# Patient Record
Sex: FEMALE | Race: WHITE | NOT HISPANIC OR LATINO | Employment: FULL TIME | URBAN - METROPOLITAN AREA
[De-identification: names, ages, dates, MRNs, and addresses within clinical notes are randomized per-mention and may not be internally consistent; named-entity substitution may affect disease eponyms.]

---

## 2019-09-13 ENCOUNTER — LAB VISIT (OUTPATIENT)
Dept: LAB | Facility: HOSPITAL | Age: 29
End: 2019-09-13
Attending: SPECIALIST
Payer: COMMERCIAL

## 2019-09-13 DIAGNOSIS — Z34.81 PRENATAL CARE, SUBSEQUENT PREGNANCY, FIRST TRIMESTER: Primary | ICD-10-CM

## 2019-09-13 LAB — HCG INTACT+B SERPL-ACNC: 5689 MIU/ML

## 2019-09-13 PROCEDURE — 84144 ASSAY OF PROGESTERONE: CPT

## 2019-09-13 PROCEDURE — 84702 CHORIONIC GONADOTROPIN TEST: CPT

## 2019-09-13 PROCEDURE — 36415 COLL VENOUS BLD VENIPUNCTURE: CPT

## 2019-09-14 LAB — PROGEST SERPL-MCNC: 25.2 NG/ML

## 2019-09-19 LAB
C TRACH RRNA SPEC QL PROBE: NORMAL
HBV SURFACE AG SERPL QL IA: NEGATIVE
HIV 1+2 AB+HIV1 P24 AG SERPL QL IA: NEGATIVE
INDIRECT COOMBS: NEGATIVE
N GONORRHOEAE, AMPLIFIED DNA: NORMAL
RPR: NORMAL
RUBELLA IMMUNE STATUS: NORMAL

## 2020-04-01 LAB — PRENATAL STREP B CULTURE: POSITIVE

## 2020-05-12 ENCOUNTER — HOSPITAL ENCOUNTER (INPATIENT)
Facility: HOSPITAL | Age: 30
LOS: 4 days | Discharge: HOME | End: 2020-05-16
Attending: SPECIALIST | Admitting: SPECIALIST
Payer: COMMERCIAL

## 2020-05-12 DIAGNOSIS — Z34.90 ENCOUNTER FOR INDUCTION OF LABOR: ICD-10-CM

## 2020-05-12 LAB
ABO + RH BLD: NORMAL
AMPHET+METHAMPHET UR QL: NEGATIVE
BARBITURATES UR QL SCN>200 NG/ML: NEGATIVE
BASOPHILS # BLD AUTO: 0.01 K/UL (ref 0–0.2)
BASOPHILS NFR BLD: 0.1 % (ref 0–1.9)
BENZODIAZ UR QL SCN>200 NG/ML: NEGATIVE
BILIRUB UR QL STRIP: NEGATIVE
BLD GP AB SCN CELLS X3 SERPL QL: NORMAL
BZE UR QL SCN: NEGATIVE
CANNABINOIDS UR QL SCN: NEGATIVE
CLARITY UR: ABNORMAL
COLOR UR: YELLOW
CREAT UR-MCNC: 88 MG/DL (ref 15–325)
DIFFERENTIAL METHOD: ABNORMAL
EOSINOPHIL # BLD AUTO: 0.1 K/UL (ref 0–0.5)
EOSINOPHIL NFR BLD: 1 % (ref 0–8)
ERYTHROCYTE [DISTWIDTH] IN BLOOD BY AUTOMATED COUNT: 13.2 % (ref 11.5–14.5)
GLUCOSE UR QL STRIP: NEGATIVE
HCT VFR BLD AUTO: 37.7 % (ref 37–48.5)
HGB BLD-MCNC: 12.8 G/DL (ref 12–16)
HGB UR QL STRIP: NEGATIVE
IMM GRANULOCYTES # BLD AUTO: 0.07 K/UL (ref 0–0.04)
IMM GRANULOCYTES NFR BLD AUTO: 0.5 % (ref 0–0.5)
KETONES UR QL STRIP: NEGATIVE
LEUKOCYTE ESTERASE UR QL STRIP: NEGATIVE
LYMPHOCYTES # BLD AUTO: 1.8 K/UL (ref 1–4.8)
LYMPHOCYTES NFR BLD: 12.5 % (ref 18–48)
MCH RBC QN AUTO: 31 PG (ref 27–31)
MCHC RBC AUTO-ENTMCNC: 34 G/DL (ref 32–36)
MCV RBC AUTO: 91 FL (ref 82–98)
MONOCYTES # BLD AUTO: 0.8 K/UL (ref 0.3–1)
MONOCYTES NFR BLD: 6 % (ref 4–15)
NEUTROPHILS # BLD AUTO: 11.2 K/UL (ref 1.8–7.7)
NEUTROPHILS NFR BLD: 79.9 % (ref 38–73)
NITRITE UR QL STRIP: NEGATIVE
NRBC BLD-RTO: 0 /100 WBC
OPIATES UR QL SCN: NEGATIVE
PCP UR QL SCN>25 NG/ML: NEGATIVE
PCP UR QL SCN>25 NG/ML: NEGATIVE
PH UR STRIP: 8 [PH] (ref 5–8)
PLATELET # BLD AUTO: 179 K/UL (ref 150–350)
PMV BLD AUTO: 11.8 FL (ref 9.2–12.9)
PROT UR QL STRIP: NEGATIVE
RBC # BLD AUTO: 4.13 M/UL (ref 4–5.4)
SARS-COV-2 RDRP RESP QL NAA+PROBE: NEGATIVE
SP GR UR STRIP: 1.02 (ref 1–1.03)
TOXICOLOGY INFORMATION: NORMAL
URN SPEC COLLECT METH UR: ABNORMAL
UROBILINOGEN UR STRIP-ACNC: NEGATIVE EU/DL
WBC # BLD AUTO: 14.03 K/UL (ref 3.9–12.7)

## 2020-05-12 PROCEDURE — 12000002 HC ACUTE/MED SURGE SEMI-PRIVATE ROOM

## 2020-05-12 PROCEDURE — 81003 URINALYSIS AUTO W/O SCOPE: CPT

## 2020-05-12 PROCEDURE — U0002 COVID-19 LAB TEST NON-CDC: HCPCS

## 2020-05-12 PROCEDURE — 85025 COMPLETE CBC W/AUTO DIFF WBC: CPT

## 2020-05-12 PROCEDURE — 25000003 PHARM REV CODE 250: Performed by: SPECIALIST

## 2020-05-12 PROCEDURE — 86592 SYPHILIS TEST NON-TREP QUAL: CPT

## 2020-05-12 PROCEDURE — 86901 BLOOD TYPING SEROLOGIC RH(D): CPT

## 2020-05-12 PROCEDURE — 80307 DRUG TEST PRSMV CHEM ANLYZR: CPT

## 2020-05-12 RX ORDER — SERTRALINE HYDROCHLORIDE 25 MG/1
25 TABLET, FILM COATED ORAL DAILY
COMMUNITY
End: 2020-09-11

## 2020-05-12 RX ORDER — ONDANSETRON 4 MG/1
8 TABLET, ORALLY DISINTEGRATING ORAL EVERY 8 HOURS PRN
Status: DISCONTINUED | OUTPATIENT
Start: 2020-05-12 | End: 2020-05-14

## 2020-05-12 RX ORDER — SODIUM CHLORIDE 9 MG/ML
INJECTION, SOLUTION INTRAVENOUS
Status: DISCONTINUED | OUTPATIENT
Start: 2020-05-12 | End: 2020-05-14

## 2020-05-12 RX ORDER — VALACYCLOVIR HYDROCHLORIDE 500 MG/1
500 TABLET, FILM COATED ORAL DAILY
Status: ON HOLD | COMMUNITY
End: 2020-05-15 | Stop reason: HOSPADM

## 2020-05-12 RX ORDER — SIMETHICONE 80 MG
1 TABLET,CHEWABLE ORAL 4 TIMES DAILY PRN
Status: DISCONTINUED | OUTPATIENT
Start: 2020-05-12 | End: 2020-05-14

## 2020-05-12 RX ORDER — MISOPROSTOL 100 MCG
25 TABLET ORAL EVERY 4 HOURS PRN
Status: COMPLETED | OUTPATIENT
Start: 2020-05-12 | End: 2020-05-13

## 2020-05-12 RX ORDER — CALCIUM CARBONATE 200(500)MG
500 TABLET,CHEWABLE ORAL 3 TIMES DAILY PRN
Status: DISCONTINUED | OUTPATIENT
Start: 2020-05-12 | End: 2020-05-14

## 2020-05-12 RX ORDER — SODIUM CHLORIDE 9 MG/ML
INJECTION, SOLUTION INTRAVENOUS CONTINUOUS
Status: DISCONTINUED | OUTPATIENT
Start: 2020-05-12 | End: 2020-05-14

## 2020-05-12 RX ADMIN — Medication 25 MCG: at 10:05

## 2020-05-13 ENCOUNTER — ANESTHESIA (OUTPATIENT)
Dept: OBSTETRICS AND GYNECOLOGY | Facility: HOSPITAL | Age: 30
End: 2020-05-13
Payer: COMMERCIAL

## 2020-05-13 ENCOUNTER — ANESTHESIA EVENT (OUTPATIENT)
Dept: OBSTETRICS AND GYNECOLOGY | Facility: HOSPITAL | Age: 30
End: 2020-05-13
Payer: COMMERCIAL

## 2020-05-13 PROCEDURE — 25000003 PHARM REV CODE 250: Performed by: SPECIALIST

## 2020-05-13 PROCEDURE — 12000002 HC ACUTE/MED SURGE SEMI-PRIVATE ROOM

## 2020-05-13 PROCEDURE — 62326 NJX INTERLAMINAR LMBR/SAC: CPT | Performed by: ANESTHESIOLOGY

## 2020-05-13 PROCEDURE — C1751 CATH, INF, PER/CENT/MIDLINE: HCPCS | Performed by: ANESTHESIOLOGY

## 2020-05-13 PROCEDURE — 63600175 PHARM REV CODE 636 W HCPCS: Performed by: ANESTHESIOLOGY

## 2020-05-13 PROCEDURE — 63600175 PHARM REV CODE 636 W HCPCS: Performed by: SPECIALIST

## 2020-05-13 PROCEDURE — 27000284 HC CANNULA NASAL: Performed by: ANESTHESIOLOGY

## 2020-05-13 PROCEDURE — 27202103: Performed by: ANESTHESIOLOGY

## 2020-05-13 RX ORDER — NALOXONE HCL 0.4 MG/ML
0.4 VIAL (ML) INJECTION SEE ADMIN INSTRUCTIONS
Status: DISCONTINUED | OUTPATIENT
Start: 2020-05-13 | End: 2020-05-14

## 2020-05-13 RX ORDER — FENTANYL/BUPIVACAINE/NS/PF 2-625MCG/1
PLASTIC BAG, INJECTION (ML) INJECTION CONTINUOUS
Status: DISCONTINUED | OUTPATIENT
Start: 2020-05-13 | End: 2020-05-14

## 2020-05-13 RX ORDER — ROPIVACAINE HYDROCHLORIDE 2 MG/ML
INJECTION, SOLUTION EPIDURAL; INFILTRATION
Status: DISCONTINUED | OUTPATIENT
Start: 2020-05-13 | End: 2020-05-14

## 2020-05-13 RX ORDER — DIPHENHYDRAMINE HYDROCHLORIDE 50 MG/ML
12.5 INJECTION INTRAMUSCULAR; INTRAVENOUS EVERY 4 HOURS PRN
Status: DISCONTINUED | OUTPATIENT
Start: 2020-05-13 | End: 2020-05-14

## 2020-05-13 RX ORDER — OXYTOCIN-SODIUM CHLORIDE 0.9% IV SOLN 30 UNIT/500ML 30-0.9/5 UT/ML-%
2 SOLUTION INTRAVENOUS CONTINUOUS
Status: DISCONTINUED | OUTPATIENT
Start: 2020-05-13 | End: 2020-05-14

## 2020-05-13 RX ORDER — EPHEDRINE SULFATE 50 MG/ML
10 INJECTION, SOLUTION INTRAVENOUS ONCE AS NEEDED
Status: COMPLETED | OUTPATIENT
Start: 2020-05-13 | End: 2020-05-14

## 2020-05-13 RX ORDER — ONDANSETRON 2 MG/ML
4 INJECTION INTRAMUSCULAR; INTRAVENOUS ONCE
Status: COMPLETED | OUTPATIENT
Start: 2020-05-13 | End: 2020-05-13

## 2020-05-13 RX ORDER — SODIUM CHLORIDE, SODIUM LACTATE, POTASSIUM CHLORIDE, CALCIUM CHLORIDE 600; 310; 30; 20 MG/100ML; MG/100ML; MG/100ML; MG/100ML
INJECTION, SOLUTION INTRAVENOUS CONTINUOUS
Status: DISCONTINUED | OUTPATIENT
Start: 2020-05-13 | End: 2020-05-14

## 2020-05-13 RX ADMIN — SODIUM CHLORIDE, SODIUM LACTATE, POTASSIUM CHLORIDE, AND CALCIUM CHLORIDE: .6; .31; .03; .02 INJECTION, SOLUTION INTRAVENOUS at 09:05

## 2020-05-13 RX ADMIN — Medication 2 MILLI-UNITS/MIN: at 11:05

## 2020-05-13 RX ADMIN — DEXTROSE 2.5 MILLION UNITS: 50 INJECTION, SOLUTION INTRAVENOUS at 09:05

## 2020-05-13 RX ADMIN — Medication 5 MILLION UNITS: at 04:05

## 2020-05-13 RX ADMIN — DEXTROSE 2.5 MILLION UNITS: 50 INJECTION, SOLUTION INTRAVENOUS at 05:05

## 2020-05-13 RX ADMIN — SODIUM CHLORIDE, SODIUM LACTATE, POTASSIUM CHLORIDE, AND CALCIUM CHLORIDE: .6; .31; .03; .02 INJECTION, SOLUTION INTRAVENOUS at 07:05

## 2020-05-13 RX ADMIN — ROPIVACAINE HYDROCHLORIDE 5 ML: 2 INJECTION, SOLUTION EPIDURAL; INFILTRATION at 09:05

## 2020-05-13 RX ADMIN — Medication 25 MCG: at 02:05

## 2020-05-13 RX ADMIN — DEXTROSE 2.5 MILLION UNITS: 50 INJECTION, SOLUTION INTRAVENOUS at 12:05

## 2020-05-13 RX ADMIN — SODIUM CHLORIDE, SODIUM LACTATE, POTASSIUM CHLORIDE, AND CALCIUM CHLORIDE 1000 ML: .6; .31; .03; .02 INJECTION, SOLUTION INTRAVENOUS at 04:05

## 2020-05-13 RX ADMIN — ONDANSETRON HYDROCHLORIDE 4 MG: 2 SOLUTION INTRAMUSCULAR; INTRAVENOUS at 08:05

## 2020-05-13 RX ADMIN — Medication 1 MILLI-UNITS/MIN: at 06:05

## 2020-05-13 RX ADMIN — DEXTROSE 2.5 MILLION UNITS: 50 INJECTION, SOLUTION INTRAVENOUS at 08:05

## 2020-05-13 RX ADMIN — SODIUM CHLORIDE, SODIUM LACTATE, POTASSIUM CHLORIDE, AND CALCIUM CHLORIDE: .6; .31; .03; .02 INJECTION, SOLUTION INTRAVENOUS at 04:05

## 2020-05-13 NOTE — ASSESSMENT & PLAN NOTE
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

## 2020-05-13 NOTE — PROGRESS NOTES
[facility]  Obstetrics  Labor Progress Note    Patient Name: Juana Jean  MRN: [id number]  Admission Date: 2020  Hospital Length of Stay: 1 days  Attending Physician: Placido Eden MD  Primary Care Provider: Mac Eden MD    Subjective:     Principal Problem:Encounter for induction of labor    Hospital Course:  29-year-old  1 para 0 at 40 weeks and 2 days gestational age admitted for induction of labor    Interval History:  Juana is a 29 y.o.  at 40w2d. She is doing well.  Comfortable with epidural    Objective:     Vital Signs (Most Recent):  Temp: 97 °F (36.1 °C) (20 0720)  Pulse: 61 (20 1050)  Resp: 18 (20 1050)  BP: 110/61 (20 1050) Vital Signs (24h Range):  Temp:  [97 °F (36.1 °C)-98.9 °F (37.2 °C)] 97 °F (36.1 °C)  Pulse:  [59-78] 61  Resp:  [16-20] 18  BP: (105-132)/(57-81) 110/61     Weight: 90.7 kg (200 lb)  Body mass index is 34.33 kg/m².    FHT:  Baseline 120s and reassuring   TOCO:  Q 2-3 minutes    Cervical Exam:  Dilation:  1  Effacement:  75%  Station: -3  Presentation: Vertex     Significant Labs:  Lab Results   Component Value Date    GROUPTRH B POS 2020    HEPBSAG Negative 2019    STREPBCULT positive 2020       I have personallly reviewed all pertinent lab results from the last 24 hours.    Physical Exam    Assessment/Plan:     29 y.o. 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

## 2020-05-13 NOTE — SUBJECTIVE & OBJECTIVE
Interval History:  Juana is a 29 y.o.  at 40w2d. She is doing well.  Comfortable with epidural    Objective:     Vital Signs (Most Recent):  Temp: 97 °F (36.1 °C) (20 0720)  Pulse: 61 (20 1050)  Resp: 18 (20 1050)  BP: 110/61 (20 1050) Vital Signs (24h Range):  Temp:  [97 °F (36.1 °C)-98.9 °F (37.2 °C)] 97 °F (36.1 °C)  Pulse:  [59-78] 61  Resp:  [16-20] 18  BP: (105-132)/(57-81) 110/61     Weight: 90.7 kg (200 lb)  Body mass index is 34.33 kg/m².    FHT:  Baseline 120s and reassuring   TOCO:  Q 2-3 minutes    Cervical Exam:  Dilation:  1  Effacement:  75%  Station: -3  Presentation: Vertex     Significant Labs:  Lab Results   Component Value Date    GROUPTRH B POS 2020    HEPBSAG Negative 2019    STREPBCULT positive 2020       I have personallly reviewed all pertinent lab results from the last 24 hours.    Physical Exam

## 2020-05-13 NOTE — ANESTHESIA PROCEDURE NOTES
Epidural    Patient location during procedure: OB   Reason for block: primary anesthetic   Diagnosis: IUP   Start time: 5/13/2020 9:15 AM  Timeout: 5/13/2020 9:15 AM  End time: 5/13/2020 9:30 AM    Staffing  Performing Provider: Faizan Martinez MD  Authorizing Provider: Faizan Martinez MD        Preanesthetic Checklist  Completed: patient identified, site marked, surgical consent, pre-op evaluation, timeout performed, IV checked, risks and benefits discussed, monitors and equipment checked, anesthesia consent given, hand hygiene performed and patient being monitored  Preparation  Patient position: sitting  Prep: Betadine  Patient monitoring: ECG and Blood Pressure  Epidural  Skin Anesthetic: lidocaine 1%  Skin Wheal: 3 mL  Administration type: continuous  Approach: midline  Interspace: L3-4    Injection technique: JONG air  Needle and Epidural Catheter  Needle type: Tuohy   Needle gauge: 17  Needle length: 3.5 inches  Needle insertion depth: 5 cm  Catheter type: springwound and multi-orifice  Catheter size: 19 G  Catheter at skin depth: 10 cm  Test dose: 5 mL of lidocaine 1.5% with Epi 1-to-200,000  Additional Documentation: incremental injection, no paresthesia on injection, no significant pain on injection, negative aspiration for heme and CSF, no signs/symptoms of IV or SA injection and no significant complaints from patient  Needle localization: anatomical landmarks  Assessment  Ease of block: easy  Patient's tolerance of the procedure: comfortable throughout block and no complaints  Additional Notes  Mild momentary right leg paresthesia with easy epidural catheter insertion. No inadvertent dural puncture with Tuohy.  Dural puncture not performed with spinal needle

## 2020-05-13 NOTE — PROGRESS NOTES
[facility]  Obstetrics  Labor Progress Note    Patient Name: Juana Jean  MRN: [id number]  Admission Date: 2020  Hospital Length of Stay: 1 days  Attending Physician: Placido Eden MD  Primary Care Provider: Mac Eden MD    Subjective:     Principal Problem:Encounter for induction of labor    Hospital Course:  29-year-old  1 para 0 at 40 weeks and 2 days gestational age admitted for induction of labor    Interval History:  Juana is a 29 y.o.  at 40w2d. She is doing well.  Patient without complaints, active fetal movement    Objective:     Vital Signs (Most Recent):  Temp: 98.1 °F (36.7 °C) (20 0400)  Pulse: 78 (20 0400)  Resp: 18 (20 0400)  BP: 125/64 (20 0400) Vital Signs (24h Range):  Temp:  [98.1 °F (36.7 °C)-98.9 °F (37.2 °C)] 98.1 °F (36.7 °C)  Pulse:  [63-78] 78  Resp:  [18-20] 18  BP: (119-128)/(60-75) 125/64     Weight: 90.7 kg (200 lb)  Body mass index is 34.33 kg/m².    FHT:  Baseline 130s and reactive   TOCO:  Q 2-3 minutes    Cervical Exam:  Dilation:  1  Effacement:  50%  Station: -3  Presentation: Vertex     Significant Labs:  Lab Results   Component Value Date    GROUPTRH B POS 2020    HEPBSAG Negative 2019    STREPBCULT positive 2020       CBC:   Recent Labs   Lab 20  2145   WBC 14.03*   RBC 4.13   HGB 12.8   HCT 37.7      MCV 91   MCH 31.0   MCHC 34.0     I have personallly reviewed all pertinent lab results from the last 24 hours.    Physical Exam:   Constitutional: She appears well-developed.       Cardiovascular: Normal rate.          Abdominal: Soft. She exhibits no distension (gravid uterus).     Genitourinary: Vagina normal. Uterus is enlarged (gravid).           Musculoskeletal: She exhibits no tenderness (no calf).             Assessment/Plan:     29 y.o. 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

## 2020-05-13 NOTE — ASSESSMENT & PLAN NOTE
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

## 2020-05-13 NOTE — SUBJECTIVE & OBJECTIVE
Interval History:  Juana is a 29 y.o.  at 40w2d. She is doing well.  Patient without complaints, active fetal movement    Objective:     Vital Signs (Most Recent):  Temp: 98.1 °F (36.7 °C) (20 0400)  Pulse: 78 (20 0400)  Resp: 18 (20 0400)  BP: 125/64 (20 0400) Vital Signs (24h Range):  Temp:  [98.1 °F (36.7 °C)-98.9 °F (37.2 °C)] 98.1 °F (36.7 °C)  Pulse:  [63-78] 78  Resp:  [18-20] 18  BP: (119-128)/(60-75) 125/64     Weight: 90.7 kg (200 lb)  Body mass index is 34.33 kg/m².    FHT:  Baseline 130s and reactive   TOCO:  Q 2-3 minutes    Cervical Exam:  Dilation:  1  Effacement:  50%  Station: -3  Presentation: Vertex     Significant Labs:  Lab Results   Component Value Date    GROUPTRH B POS 2020    HEPBSAG Negative 2019    STREPBCULT positive 2020       CBC:   Recent Labs   Lab 20  2145   WBC 14.03*   RBC 4.13   HGB 12.8   HCT 37.7      MCV 91   MCH 31.0   MCHC 34.0     I have personallly reviewed all pertinent lab results from the last 24 hours.    Physical Exam:   Constitutional: She appears well-developed.       Cardiovascular: Normal rate.          Abdominal: Soft. She exhibits no distension (gravid uterus).     Genitourinary: Vagina normal. Uterus is enlarged (gravid).           Musculoskeletal: She exhibits no tenderness (no calf).

## 2020-05-13 NOTE — PROGRESS NOTES
[facility]  Obstetrics  Labor Progress Note    Patient Name: Juana Jean  MRN: [id number]  Admission Date: 2020  Hospital Length of Stay: 1 days  Attending Physician: Placido Eden MD  Primary Care Provider: Mac Eden MD    Subjective:     Principal Problem:Encounter for induction of labor    Hospital Course:  29-year-old  1 para 0 at 40 weeks and 2 days gestational age admitted for induction of labor    Interval History:  Juana is a 29 y.o.  at 40w2d. She is doing well.  No complaints    Objective:     Vital Signs (Most Recent):  Temp: 98.6 °F (37 °C) (20 1452)  Pulse: 69 (20 1620)  Resp: 18 (20 1613)  BP: (!) 111/56 (20 1620) Vital Signs (24h Range):  Temp:  [97 °F (36.1 °C)-98.9 °F (37.2 °C)] 98.6 °F (37 °C)  Pulse:  [59-98] 69  Resp:  [16-20] 18  BP: ()/(45-83) 111/56     Weight: 90.7 kg (200 lb)  Body mass index is 34.33 kg/m².    FHT:  Baseline 120s and reactive, 4-5 minute decel  into the 90s with spontaneous resolution   TOCO: Contractions not recording well, will place IUPC    Cervical Exam:  Dilation:  4  Effacement:  80%  Station: -3  Presentation: Vertex     Significant Labs:  Lab Results   Component Value Date    GROUPTRH B POS 2020    HEPBSAG Negative 2019    STREPBCULT positive 2020       I have personallly reviewed all pertinent lab results from the last 24 hours.    Physical Exam    Assessment/Plan:     29 y.o. 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

## 2020-05-13 NOTE — ANESTHESIA PREPROCEDURE EVALUATION
05/13/2020  Juana Jean is a 29 y.o., female.    Anesthesia Evaluation    I have reviewed the Patient Summary Reports.    I have reviewed the Nursing Notes.   I have reviewed the Medications.     Review of Systems  Anesthesia Hx:  No PSHx Denies Family Hx of Anesthesia complications.   Denies Personal Hx of Anesthesia complications.   Social:  Non-Smoker    EENT/Dental:   Chipped left upper central incisor   OB/GYN/PEDS:  G1   Endocrine:   Hypothyroidism    Psych:   depression          Physical Exam  General:  Well nourished    Airway/Jaw/Neck:  Airway Findings: Mouth Opening: Normal Tongue: Normal  Mallampati: II  TM Distance: Normal, at least 6 cm  Jaw/Neck Findings:  Neck ROM: Normal ROM      Dental:  Dental Findings: In tact   Chest/Lungs:  Chest/Lungs Findings: Clear to auscultation, Normal Respiratory Rate     Heart/Vascular:  Heart Findings: Rate: Normal  Rhythm: Regular Rhythm  Sounds: Normal  Heart murmur: negative       Mental Status:  Mental Status Findings:  Cooperative, Alert and Oriented         Anesthesia Plan  Type of Anesthesia, risks & benefits discussed:  Anesthesia Type:  epidural  Patient's Preference:   Intra-op Monitoring Plan: standard ASA monitors  Intra-op Monitoring Plan Comments:   Post Op Pain Control Plan:   Post Op Pain Control Plan Comments:   Induction:    Beta Blocker:  Patient is not currently on a Beta-Blocker (No further documentation required).       Informed Consent: Patient understands risks and agrees with Anesthesia plan.  Questions answered. Anesthesia consent signed with patient.  ASA Score: 2     Day of Surgery Review of History & Physical:            Ready For Surgery From Anesthesia Perspective.

## 2020-05-13 NOTE — ASSESSMENT & PLAN NOTE
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

## 2020-05-13 NOTE — SUBJECTIVE & OBJECTIVE
Interval History:  Juana is a 29 y.o.  at 40w2d. She is doing well.  No complaints    Objective:     Vital Signs (Most Recent):  Temp: 98.6 °F (37 °C) (20 1452)  Pulse: 69 (20 1620)  Resp: 18 (20 1613)  BP: (!) 111/56 (20 1620) Vital Signs (24h Range):  Temp:  [97 °F (36.1 °C)-98.9 °F (37.2 °C)] 98.6 °F (37 °C)  Pulse:  [59-98] 69  Resp:  [16-20] 18  BP: ()/(45-83) 111/56     Weight: 90.7 kg (200 lb)  Body mass index is 34.33 kg/m².    FHT:  Baseline 120s and reactive, 4-5 minute decel  into the 90s with spontaneous resolution   TOCO: Contractions not recording well, will place IUPC    Cervical Exam:  Dilation:  4  Effacement:  80%  Station: -3  Presentation: Vertex     Significant Labs:  Lab Results   Component Value Date    GROUPTRH B POS 2020    HEPBSAG Negative 2019    STREPBCULT positive 2020       I have personallly reviewed all pertinent lab results from the last 24 hours.    Physical Exam

## 2020-05-14 ENCOUNTER — ANESTHESIA (OUTPATIENT)
Dept: OBSTETRICS AND GYNECOLOGY | Facility: HOSPITAL | Age: 30
End: 2020-05-14

## 2020-05-14 ENCOUNTER — ANESTHESIA EVENT (OUTPATIENT)
Dept: OBSTETRICS AND GYNECOLOGY | Facility: HOSPITAL | Age: 30
End: 2020-05-14

## 2020-05-14 LAB
BASOPHILS # BLD AUTO: 0.03 K/UL (ref 0–0.2)
BASOPHILS NFR BLD: 0.1 % (ref 0–1.9)
DIFFERENTIAL METHOD: ABNORMAL
EOSINOPHIL # BLD AUTO: 0 K/UL (ref 0–0.5)
EOSINOPHIL NFR BLD: 0 % (ref 0–8)
ERYTHROCYTE [DISTWIDTH] IN BLOOD BY AUTOMATED COUNT: 13.2 % (ref 11.5–14.5)
HCT VFR BLD AUTO: 36.2 % (ref 37–48.5)
HGB BLD-MCNC: 12.2 G/DL (ref 12–16)
IMM GRANULOCYTES # BLD AUTO: 0.14 K/UL (ref 0–0.04)
IMM GRANULOCYTES NFR BLD AUTO: 0.6 % (ref 0–0.5)
LYMPHOCYTES # BLD AUTO: 0.8 K/UL (ref 1–4.8)
LYMPHOCYTES NFR BLD: 3.4 % (ref 18–48)
MCH RBC QN AUTO: 30.9 PG (ref 27–31)
MCHC RBC AUTO-ENTMCNC: 33.7 G/DL (ref 32–36)
MCV RBC AUTO: 92 FL (ref 82–98)
MONOCYTES # BLD AUTO: 1.2 K/UL (ref 0.3–1)
MONOCYTES NFR BLD: 5.2 % (ref 4–15)
NEUTROPHILS # BLD AUTO: 21.4 K/UL (ref 1.8–7.7)
NEUTROPHILS NFR BLD: 90.7 % (ref 38–73)
NRBC BLD-RTO: 0 /100 WBC
PLATELET # BLD AUTO: 161 K/UL (ref 150–350)
PMV BLD AUTO: 11.5 FL (ref 9.2–12.9)
RBC # BLD AUTO: 3.95 M/UL (ref 4–5.4)
WBC # BLD AUTO: 23.54 K/UL (ref 3.9–12.7)

## 2020-05-14 PROCEDURE — 63600175 PHARM REV CODE 636 W HCPCS: Performed by: SPECIALIST

## 2020-05-14 PROCEDURE — 71000039 HC RECOVERY, EACH ADD'L HOUR: Performed by: SPECIALIST

## 2020-05-14 PROCEDURE — 85025 COMPLETE CBC W/AUTO DIFF WBC: CPT

## 2020-05-14 PROCEDURE — 25000003 PHARM REV CODE 250: Performed by: NURSE ANESTHETIST, CERTIFIED REGISTERED

## 2020-05-14 PROCEDURE — 63600175 PHARM REV CODE 636 W HCPCS: Performed by: NURSE ANESTHETIST, CERTIFIED REGISTERED

## 2020-05-14 PROCEDURE — 37000008 HC ANESTHESIA 1ST 15 MINUTES: Performed by: SPECIALIST

## 2020-05-14 PROCEDURE — 36000685 HC CESAREAN SECTION LEVEL I

## 2020-05-14 PROCEDURE — 71000033 HC RECOVERY, INTIAL HOUR: Performed by: SPECIALIST

## 2020-05-14 PROCEDURE — 12000002 HC ACUTE/MED SURGE SEMI-PRIVATE ROOM

## 2020-05-14 PROCEDURE — 25000003 PHARM REV CODE 250: Performed by: SPECIALIST

## 2020-05-14 PROCEDURE — 37000009 HC ANESTHESIA EA ADD 15 MINS: Performed by: SPECIALIST

## 2020-05-14 PROCEDURE — 25000003 PHARM REV CODE 250: Performed by: ANESTHESIOLOGY

## 2020-05-14 RX ORDER — BUPIVACAINE HYDROCHLORIDE 5 MG/ML
24 INJECTION, SOLUTION PERINEURAL ONCE
Status: DISCONTINUED | OUTPATIENT
Start: 2020-05-14 | End: 2020-05-14

## 2020-05-14 RX ORDER — ONDANSETRON 2 MG/ML
INJECTION INTRAMUSCULAR; INTRAVENOUS
Status: DISCONTINUED | OUTPATIENT
Start: 2020-05-14 | End: 2020-05-14

## 2020-05-14 RX ORDER — FAMOTIDINE 10 MG/ML
20 INJECTION INTRAVENOUS 2 TIMES DAILY PRN
Status: DISCONTINUED | OUTPATIENT
Start: 2020-05-14 | End: 2020-05-16 | Stop reason: HOSPADM

## 2020-05-14 RX ORDER — OXYTOCIN-SODIUM CHLORIDE 0.9% IV SOLN 30 UNIT/500ML 30-0.9/5 UT/ML-%
SOLUTION INTRAVENOUS CONTINUOUS PRN
Status: DISCONTINUED | OUTPATIENT
Start: 2020-05-14 | End: 2020-05-14

## 2020-05-14 RX ORDER — SODIUM CHLORIDE, SODIUM LACTATE, POTASSIUM CHLORIDE, CALCIUM CHLORIDE 600; 310; 30; 20 MG/100ML; MG/100ML; MG/100ML; MG/100ML
INJECTION, SOLUTION INTRAVENOUS CONTINUOUS PRN
Status: DISCONTINUED | OUTPATIENT
Start: 2020-05-14 | End: 2020-05-14

## 2020-05-14 RX ORDER — SERTRALINE HYDROCHLORIDE 25 MG/1
25 TABLET, FILM COATED ORAL DAILY
Status: DISCONTINUED | OUTPATIENT
Start: 2020-05-14 | End: 2020-05-16 | Stop reason: HOSPADM

## 2020-05-14 RX ORDER — ONDANSETRON 4 MG/1
8 TABLET, ORALLY DISINTEGRATING ORAL EVERY 8 HOURS PRN
Status: DISCONTINUED | OUTPATIENT
Start: 2020-05-14 | End: 2020-05-16 | Stop reason: HOSPADM

## 2020-05-14 RX ORDER — MORPHINE SULFATE 0.5 MG/ML
INJECTION, SOLUTION EPIDURAL; INTRATHECAL; INTRAVENOUS
Status: DISCONTINUED | OUTPATIENT
Start: 2020-05-14 | End: 2020-05-14

## 2020-05-14 RX ORDER — OXYTOCIN-SODIUM CHLORIDE 0.9% IV SOLN 30 UNIT/500ML 30-0.9/5 UT/ML-%
42 SOLUTION INTRAVENOUS CONTINUOUS
Status: DISPENSED | OUTPATIENT
Start: 2020-05-14 | End: 2020-05-14

## 2020-05-14 RX ORDER — MISOPROSTOL 100 UG/1
800 TABLET ORAL
Status: DISCONTINUED | OUTPATIENT
Start: 2020-05-14 | End: 2020-05-14

## 2020-05-14 RX ORDER — OXYTOCIN-SODIUM CHLORIDE 0.9% IV SOLN 30 UNIT/500ML 30-0.9/5 UT/ML-%
42 SOLUTION INTRAVENOUS CONTINUOUS
Status: DISCONTINUED | OUTPATIENT
Start: 2020-05-14 | End: 2020-05-14

## 2020-05-14 RX ORDER — HYDROMORPHONE HYDROCHLORIDE 1 MG/ML
2 INJECTION, SOLUTION INTRAMUSCULAR; INTRAVENOUS; SUBCUTANEOUS
Status: DISCONTINUED | OUTPATIENT
Start: 2020-05-14 | End: 2020-05-16 | Stop reason: HOSPADM

## 2020-05-14 RX ORDER — METHYLERGONOVINE MALEATE 0.2 MG/ML
200 INJECTION INTRAVENOUS
Status: DISCONTINUED | OUTPATIENT
Start: 2020-05-14 | End: 2020-05-14

## 2020-05-14 RX ORDER — CARBOPROST TROMETHAMINE 250 UG/ML
250 INJECTION, SOLUTION INTRAMUSCULAR
Status: DISCONTINUED | OUTPATIENT
Start: 2020-05-14 | End: 2020-05-14

## 2020-05-14 RX ORDER — DIPHENHYDRAMINE HYDROCHLORIDE 50 MG/ML
25 INJECTION INTRAMUSCULAR; INTRAVENOUS EVERY 6 HOURS PRN
Status: DISCONTINUED | OUTPATIENT
Start: 2020-05-14 | End: 2020-05-14

## 2020-05-14 RX ORDER — BISACODYL 10 MG
10 SUPPOSITORY, RECTAL RECTAL ONCE AS NEEDED
Status: DISCONTINUED | OUTPATIENT
Start: 2020-05-14 | End: 2020-05-16 | Stop reason: HOSPADM

## 2020-05-14 RX ORDER — LIDOCAINE HCL/EPINEPHRINE/PF 2%-1:200K
VIAL (ML) INJECTION
Status: DISCONTINUED | OUTPATIENT
Start: 2020-05-14 | End: 2020-05-14

## 2020-05-14 RX ORDER — SODIUM CHLORIDE, SODIUM LACTATE, POTASSIUM CHLORIDE, CALCIUM CHLORIDE 600; 310; 30; 20 MG/100ML; MG/100ML; MG/100ML; MG/100ML
INJECTION, SOLUTION INTRAVENOUS CONTINUOUS
Status: ACTIVE | OUTPATIENT
Start: 2020-05-14 | End: 2020-05-14

## 2020-05-14 RX ORDER — CEFAZOLIN SODIUM 2 G/50ML
2 SOLUTION INTRAVENOUS ONCE
Status: COMPLETED | OUTPATIENT
Start: 2020-05-14 | End: 2020-05-14

## 2020-05-14 RX ORDER — ADHESIVE BANDAGE
30 BANDAGE TOPICAL 2 TIMES DAILY PRN
Status: DISCONTINUED | OUTPATIENT
Start: 2020-05-15 | End: 2020-05-16 | Stop reason: HOSPADM

## 2020-05-14 RX ORDER — PHENYLEPHRINE HYDROCHLORIDE 10 MG/ML
INJECTION INTRAVENOUS
Status: DISCONTINUED | OUTPATIENT
Start: 2020-05-14 | End: 2020-05-14

## 2020-05-14 RX ORDER — AMOXICILLIN 250 MG
1 CAPSULE ORAL 2 TIMES DAILY PRN
Status: DISCONTINUED | OUTPATIENT
Start: 2020-05-14 | End: 2020-05-16 | Stop reason: HOSPADM

## 2020-05-14 RX ORDER — LEVOTHYROXINE SODIUM 100 UG/1
200 TABLET ORAL
Status: DISCONTINUED | OUTPATIENT
Start: 2020-05-14 | End: 2020-05-16 | Stop reason: HOSPADM

## 2020-05-14 RX ORDER — NALBUPHINE HYDROCHLORIDE 20 MG/ML
5 INJECTION, SOLUTION INTRAMUSCULAR; INTRAVENOUS; SUBCUTANEOUS
Status: DISCONTINUED | OUTPATIENT
Start: 2020-05-14 | End: 2020-05-16 | Stop reason: HOSPADM

## 2020-05-14 RX ORDER — OXYCODONE AND ACETAMINOPHEN 10; 325 MG/1; MG/1
1 TABLET ORAL EVERY 4 HOURS PRN
Status: DISCONTINUED | OUTPATIENT
Start: 2020-05-14 | End: 2020-05-16 | Stop reason: HOSPADM

## 2020-05-14 RX ORDER — MUPIROCIN 20 MG/G
1 OINTMENT TOPICAL 2 TIMES DAILY
Status: DISCONTINUED | OUTPATIENT
Start: 2020-05-14 | End: 2020-05-16 | Stop reason: HOSPADM

## 2020-05-14 RX ORDER — ONDANSETRON 2 MG/ML
4 INJECTION INTRAMUSCULAR; INTRAVENOUS EVERY 6 HOURS PRN
Status: ACTIVE | OUTPATIENT
Start: 2020-05-14 | End: 2020-05-16

## 2020-05-14 RX ORDER — SODIUM CHLORIDE 9 MG/ML
INJECTION, SOLUTION INTRAVENOUS CONTINUOUS
Status: DISCONTINUED | OUTPATIENT
Start: 2020-05-14 | End: 2020-05-16 | Stop reason: HOSPADM

## 2020-05-14 RX ORDER — PRENATAL WITH FERROUS FUM AND FOLIC ACID 3080; 920; 120; 400; 22; 1.84; 3; 20; 10; 1; 12; 200; 27; 25; 2 [IU]/1; [IU]/1; MG/1; [IU]/1; MG/1; MG/1; MG/1; MG/1; MG/1; MG/1; UG/1; MG/1; MG/1; MG/1; MG/1
1 TABLET ORAL DAILY
Status: DISCONTINUED | OUTPATIENT
Start: 2020-05-14 | End: 2020-05-16 | Stop reason: HOSPADM

## 2020-05-14 RX ORDER — MUPIROCIN 20 MG/G
OINTMENT TOPICAL
Status: DISCONTINUED | OUTPATIENT
Start: 2020-05-14 | End: 2020-05-14 | Stop reason: HOSPADM

## 2020-05-14 RX ORDER — MEPERIDINE HYDROCHLORIDE 100 MG/ML
INJECTION INTRAMUSCULAR; INTRAVENOUS; SUBCUTANEOUS
Status: DISCONTINUED | OUTPATIENT
Start: 2020-05-14 | End: 2020-05-14

## 2020-05-14 RX ORDER — OXYCODONE HYDROCHLORIDE 5 MG/1
10 TABLET ORAL EVERY 4 HOURS PRN
Status: DISCONTINUED | OUTPATIENT
Start: 2020-05-14 | End: 2020-05-16 | Stop reason: HOSPADM

## 2020-05-14 RX ORDER — DIPHENHYDRAMINE HCL 25 MG
25 CAPSULE ORAL EVERY 4 HOURS PRN
Status: DISCONTINUED | OUTPATIENT
Start: 2020-05-14 | End: 2020-05-16 | Stop reason: HOSPADM

## 2020-05-14 RX ORDER — ACETAMINOPHEN 10 MG/ML
INJECTION, SOLUTION INTRAVENOUS
Status: DISCONTINUED | OUTPATIENT
Start: 2020-05-14 | End: 2020-05-14

## 2020-05-14 RX ORDER — OXYCODONE AND ACETAMINOPHEN 5; 325 MG/1; MG/1
1 TABLET ORAL EVERY 4 HOURS PRN
Status: DISCONTINUED | OUTPATIENT
Start: 2020-05-14 | End: 2020-05-16 | Stop reason: HOSPADM

## 2020-05-14 RX ORDER — DOCUSATE SODIUM 100 MG/1
200 CAPSULE, LIQUID FILLED ORAL 2 TIMES DAILY
Status: DISCONTINUED | OUTPATIENT
Start: 2020-05-14 | End: 2020-05-16 | Stop reason: HOSPADM

## 2020-05-14 RX ADMIN — ACETAMINOPHEN 1000 MG: 10 INJECTION, SOLUTION INTRAVENOUS at 01:05

## 2020-05-14 RX ADMIN — SODIUM CHLORIDE, SODIUM LACTATE, POTASSIUM CHLORIDE, AND CALCIUM CHLORIDE: .6; .31; .03; .02 INJECTION, SOLUTION INTRAVENOUS at 01:05

## 2020-05-14 RX ADMIN — IBUPROFEN 600 MG: 200 TABLET, FILM COATED ORAL at 11:05

## 2020-05-14 RX ADMIN — LIDOCAINE HYDROCHLORIDE,EPINEPHRINE BITARTRATE 5 ML: 20; .005 INJECTION, SOLUTION EPIDURAL; INFILTRATION; INTRACAUDAL; PERINEURAL at 01:05

## 2020-05-14 RX ADMIN — LEVOTHYROXINE SODIUM 200 MCG: 100 TABLET ORAL at 06:05

## 2020-05-14 RX ADMIN — CEFAZOLIN 2 G: 1 INJECTION, POWDER, FOR SOLUTION INTRAVENOUS at 01:05

## 2020-05-14 RX ADMIN — EPHEDRINE SULFATE 10 MG: 50 INJECTION, SOLUTION INTRAVENOUS at 01:05

## 2020-05-14 RX ADMIN — IBUPROFEN 600 MG: 200 TABLET, FILM COATED ORAL at 12:05

## 2020-05-14 RX ADMIN — DIPHENHYDRAMINE HYDROCHLORIDE 25 MG: 25 CAPSULE ORAL at 06:05

## 2020-05-14 RX ADMIN — PRENATAL VIT W/ FE FUMARATE-FA TAB 27-0.8 MG 1 TABLET: 27-0.8 TAB at 12:05

## 2020-05-14 RX ADMIN — PHENYLEPHRINE HYDROCHLORIDE 200 MCG: 10 INJECTION INTRAVENOUS at 01:05

## 2020-05-14 RX ADMIN — Medication 1000 ML/HR: at 01:05

## 2020-05-14 RX ADMIN — ONDANSETRON 4 MG: 2 INJECTION INTRAMUSCULAR; INTRAVENOUS at 01:05

## 2020-05-14 RX ADMIN — DOCUSATE SODIUM 200 MG: 100 CAPSULE, LIQUID FILLED ORAL at 12:05

## 2020-05-14 RX ADMIN — MORPHINE SULFATE 2.5 MG: 0.5 INJECTION, SOLUTION EPIDURAL; INTRATHECAL; INTRAVENOUS at 01:05

## 2020-05-14 RX ADMIN — Medication: at 09:05

## 2020-05-14 RX ADMIN — PHENYLEPHRINE HYDROCHLORIDE 100 MCG: 10 INJECTION INTRAVENOUS at 01:05

## 2020-05-14 RX ADMIN — NALBUPHINE HYDROCHLORIDE 5 MG: 20 INJECTION, SOLUTION INTRAMUSCULAR; INTRAVENOUS; SUBCUTANEOUS at 07:05

## 2020-05-14 RX ADMIN — Medication 12.5 MG: at 01:05

## 2020-05-14 RX ADMIN — Medication 12.5 MG: at 02:05

## 2020-05-14 RX ADMIN — SODIUM CHLORIDE, SODIUM LACTATE, POTASSIUM CHLORIDE, AND CALCIUM CHLORIDE: .6; .31; .03; .02 INJECTION, SOLUTION INTRAVENOUS at 07:05

## 2020-05-14 RX ADMIN — IBUPROFEN 600 MG: 200 TABLET, FILM COATED ORAL at 06:05

## 2020-05-14 RX ADMIN — SERTRALINE HYDROCHLORIDE 25 MG: 25 TABLET ORAL at 12:05

## 2020-05-14 RX ADMIN — DOCUSATE SODIUM 200 MG: 100 CAPSULE, LIQUID FILLED ORAL at 08:05

## 2020-05-14 NOTE — NURSING
Pt mepilex drainage doubled in size with bright red drainage.  Dr. Eden notified and new order to reinforce dressing received.  ABD and metaphor tape applied with help of Juana from L&D.  Will continue to monitor.

## 2020-05-14 NOTE — SUBJECTIVE & OBJECTIVE
Hospital course: 29-year-old  1 para 0 at 40 weeks and 2 days gestational age admitted for induction of labor    Interval History:  Postop day 0-status post [id number] well    She is doing well this morning. She is tolerating a regular diet without nausea or vomiting. She is voiding spontaneously. She is ambulating. She has not passed flatus, and has not a BM. Vaginal bleeding is mild. She denies fever or chills. Abdominal pain is mild and controlled with oral medications. She is breastfeeding. She desires circumcision for her male baby: not applicable.    Objective:     Vital Signs (Most Recent):  Temp: 98.8 °F (37.1 °C) (20 1155)  Pulse: 84 (20 1155)  Resp: 17 (20 1155)  BP: 119/81 (20 1155)  SpO2: 97 % (20 1155) Vital Signs (24h Range):  Temp:  [98.3 °F (36.8 °C)-98.8 °F (37.1 °C)] 98.8 °F (37.1 °C)  Pulse:  [] 84  Resp:  [17-20] 17  SpO2:  [95 %-100 %] 97 %  BP: ()/(44-82) 119/81     Weight: 90.7 kg (200 lb)  Body mass index is 34.33 kg/m².      Intake/Output Summary (Last 24 hours) at 2020 1453  Last data filed at 2020 1230  Gross per 24 hour   Intake 900 ml   Output 3650 ml   Net -2750 ml       Significant Labs:  Lab Results   Component Value Date    GROUPTRH B POS 2020    HEPBSAG Negative 2019    STREPBCULT positive 2020     Recent Labs   Lab 20  0417   HGB 12.2   HCT 36.2*       I have personallly reviewed all pertinent lab results from the last 24 hours.    Physical Exam:       Cardiovascular: Normal rate.          Abdominal: Soft. Bowel sounds are normal. She exhibits abdominal incision (clean, dry, intact). There is no tenderness.     Genitourinary: Uterus is enlarged (firm and below umbilicus). There is bleeding (minimal) in the vagina.           Musculoskeletal: She exhibits no tenderness (no calf tenderness).

## 2020-05-14 NOTE — NURSING TRANSFER
Nursing Transfer Note      5/14/2020     Transfer To:2120     Transfer via bed    Transfer with belongings    Transported by nurse    Medicines sent: n/a    Chart send with patient: Yes    Notified: n/a    Patient reassessed at: 0400 (date, time)    Upon arrival to floor: patient oriented to room, call bell in reach and bed in lowest position

## 2020-05-14 NOTE — L&D DELIVERY NOTE
[facility]   Section   Operative Note    SUMMARY     Date of Procedure: 2020     Procedure: Procedure(s) (LRB):   SECTION (N/A)    Surgeon(s) and Role:     * Placido Eden MD - Primary    Assisting Surgeon: None    Patient reached a maximum cervical dilation of 6 cm and made no further cervical change, and with repetitive fetal heart rate deceleration decision was made to proceed with primary  section    Pre-Operative Diagnosis: Failure to Progress  Fetal Intolerance of Labor    Post-Operative Diagnosis: Post-Op Diagnosis Codes:     * Pregnant [Z34.90]    Anesthesia: Epidural    Technical Procedures Used:  Low-transverse  section           Description of the Findings of the Procedure:  Nuchal cord x1, OP presentation    Significant Surgical Tasks Conducted by the Assistant(s), if Applicable:  Typical    Complications: No    Blood Loss: 500 mL     With patient in supine position, the legs are  and Silva Catheter placed and positioning to supine done.   Abdomen prepped with Chloroprep and 3 minute drying time allowed prior to draping of the abdomen.   Time out taken with OR team members.  Pfannenstiel Incision made through the skin, transverse fascial incision developed, rectus muscles  in the midline and the peritoneum entered.   no adhesions noted.  Gene wound retractor placed  The lower uterine segment and position of the fetus identified.   Bladder flap taken down through transverse peritoneal incision.    Low Transverse Incision made through well developer lower uterine segment and extended laterally with blunt dissection.   Clear fluid noted.  Infant delivered from vertex presentation.  Cord clamped after one minute and  handed to attending nurse.  Cord blood taken, placenta delivered.  The uterus wasnot exteriorized.  The edges of the uterine incision are grasped with Strauss clamps at the angles and the inferior and superior  midline edges of the incision.    Closure with running lock 0 Monocryl, starting at each angle, tying in the midline.   Observation for bleeding with suture of any bleeding along the hysterotomy line.   With good hemostasis noted, the anterior pelvis is rinsed with sterile saline.   Right and left adnexa with normal anatomy.    Gene wound retractor removed  Closure of the abdomen with 2 0 Vicryl running of the peritoneum, fascial closure with 0 Maxon starting at the distal angle and tying the knot at the proximal angle.  Skin closure with 4 0 Vicryl subcuticular.  Wound dressed with  Mepilex.          Specimens:   Specimen (12h ago, onward)    None          Condition: Good    Disposition: PACU - hemodynamically stable.    Attestation: Good       Viable female infant with Apgar scores of 8/9 and a weight of 7 lb 8 oz  Estimated blood loss 600 cc    Delivery Information for Marcial Jean    Birth information:  YOB: 2021   Time of birth: 1:28 AM   Sex: female   Head Delivery Date/Time: 2020  1:28 AM   Delivery type: , Low Transverse   Gestational Age: 40w3d    Delivery Providers    Delivering clinician:             Measurements    Weight:    Length:           Apgars    Living status:    Apgars:   1 min.:   5 min.:   10 min.:   15 min.:   20 min.:     Skin color:          Heart rate:          Reflex irritability:          Muscle tone:          Respiratory effort:          Total:                                 Interventions/Resuscitation         Cord    No data filed        Placenta    Placenta delivery date/time:    Placenta removal:             Labor Events:       labor:       Labor Onset Date/Time:         Dilation Complete Date/Time:         Start Pushing Date/Time:         Start Pushing Date/Time:       Rupture Date/Time: 20  0714         Rupture type:           Fluid Amount:        Fluid Color: Clear      Fluid Odor:        Membrane Status: ARM (Artificial  Rupture)               steroids:       Antibiotics given for GBS:       Induction:       Indications for induction:        Augmentation:       Indications for augmentation:       Labor complications:       Additional complications:          Cervical ripening:                     Delivery:      Episiotomy:       Indication for Episiotomy:       Perineal Lacerations:   Repaired:      Periurethral Laceration:   Repaired:     Labial Laceration:   Repaired:     Sulcus Laceration:   Repaired:     Vaginal Laceration:   Repaired:     Cervical Laceration:   Repaired:     Repair suture:       Repair # of packets:       Last Value - EBL - Nursing (mL):       Sum - EBL - Nursing (mL): 500     Last Value - EBL - Anesthesia (mL): 500      Calculated QBL (mL):        Vaginal Sweep Performed:       Surgicount Correct:         Other providers:            Details (if applicable):  Trial of Labor Yes    Categorization: Primary    Priority:     Indications for : Fetal Intolerance of Labor;Failure to Progress   Incision Type:       Additional  information:  Forceps:    Vacuum:    Breech:    Observed anomalies    Other (Comments):

## 2020-05-14 NOTE — ANESTHESIA POST-OP PAIN MANAGEMENT
The patient is one day status post receiving an epidural for her  section. The patient shows no signs of oversedation.  She reports no headache and no sensory/motor deficits in lower extremities. The patient reports no back pain, and there are no signs of infection at the epidural site. The epidural catheter has been removed. There are no other anesthesia issues. Pt is overall satisfied with care.

## 2020-05-14 NOTE — PLAN OF CARE
Patient in no apparent distress. VSS. Bedrest maintained per MD order.  Pt mepilex noted to have active drainage, reinforced per MD order, will continue to monitor.  Pt bleeding light to moderate, fundus firm without massage.  Will continue to monitor.  Denies pain.  Starting diet clear liquids and will advance as tolerated. Silva draining clear, yellow urine.  No additional needs at this time.

## 2020-05-14 NOTE — PROGRESS NOTES
[facility]  Obstetrics  Postpartum Progress Note    Patient Name: Juana Jean  MRN: [id number]  Admission Date: 2020  Hospital Length of Stay: 2 days  Attending Physician: Placido Eden MD  Primary Care Provider: Mac Eden MD    Subjective:     Principal Problem:Encounter for induction of labor    Hospital course: 29-year-old  1 para 0 at 40 weeks and 2 days gestational age admitted for induction of labor    Interval History:  Postop day 0-status post Q-[id number]-[id number] well    She is doing well this morning. She is tolerating a regular diet without nausea or vomiting. She is voiding spontaneously. She is ambulating. She has not passed flatus, and has not a BM. Vaginal bleeding is mild. She denies fever or chills. Abdominal pain is mild and controlled with oral medications. She is breastfeeding. She desires circumcision for her male baby: not applicable.    Objective:     Vital Signs (Most Recent):  Temp: 98.8 °F (37.1 °C) (20 1155)  Pulse: 84 (20 1155)  Resp: 17 (20 1155)  BP: 119/81 (20 1155)  SpO2: 97 % (20 1155) Vital Signs (24h Range):  Temp:  [98.3 °F (36.8 °C)-98.8 °F (37.1 °C)] 98.8 °F (37.1 °C)  Pulse:  [] 84  Resp:  [17-20] 17  SpO2:  [95 %-100 %] 97 %  BP: ()/(44-82) 119/81     Weight: 90.7 kg (200 lb)  Body mass index is 34.33 kg/m².      Intake/Output Summary (Last 24 hours) at 2020 1453  Last data filed at 2020 1230  Gross per 24 hour   Intake 900 ml   Output 3650 ml   Net -2750 ml       Significant Labs:  Lab Results   Component Value Date    GROUPTRH B POS 2020    HEPBSAG Negative 2019    STREPBCULT positive 2020     Recent Labs   Lab 20  0417   HGB 12.2   HCT 36.2*       I have personallly reviewed all pertinent lab results from the last 24 hours.    Physical Exam:       Cardiovascular: Normal rate.          Abdominal: Soft. Bowel sounds are normal. She exhibits abdominal incision (clean,  dry, intact). There is no tenderness.     Genitourinary: Uterus is enlarged (firm and below umbilicus). There is bleeding (minimal) in the vagina.           Musculoskeletal: She exhibits no tenderness (no calf tenderness).             Assessment/Plan:     29 y.o. female  for:    * Encounter for induction of labor  Pod 1.-status post K-vhhyaxk-kqjsi well  Advanced orders        Disposition: As patient meets milestones, will plan to discharge 1-2 days, pending baby's discharge.    Mac Eden MD  Obstetrics  [facility]

## 2020-05-14 NOTE — LACTATION NOTE
This note was copied from a baby's chart.  Assisted with position & latch. Instructed on proper latch to facilitate effective breastfeeding.  Discussed recognizing hunger cues, appropriate positioning and wide mouth latch.  Discussed ways to determine an effective latch including:  areola included in latch, rhythmic/nutritive sucking and audible swallowing.  A  New Beginning booklet given and  breastfeeding chapter reviewed.  Discussed:     Supply and Demand:  The more you nurse the baby the more milk you will make.   Avoid bottles and pacifiers for the first 4 weeks.   Feed your baby only breastmilk for the first 6 months per AAP guidelines.   Feed your baby On Cue at the earliest sign of hunger or need for comfort:  o Sucking on fingers or hands  o Bringing hands toward his mouth  o Rooting or reaching for something to suck on  o Sucking motions with mouth  o Fretful noises  o Crying is a late sign of hunger or comfort.   The baby should be positioned and latched on to the breast correctly  o Chest-to-chest, chin in the breast  o Babys lips are flipped outward  o Babys mouth is stretched open wide like a shout  o Babys sucking should feel like tugging to the mother  - The baby should be drinking at the breast  o You should hear an occasional swallow during the feeding  o Switch breasts when the baby takes himself off the breast or falls asleep  o Keep offering breasts until the baby looks full, no longer gives hunger signs, and stays asleep when placed on his back in the crib  - If the baby is sleepy and wont wake for a feeding, put the baby skin-to-skin dressed in a diaper against the mothers bare chest  - Sleep with your baby near you in the hospital room  - Call the nurse/lactation consultant for additional assistance as needed.    Mom States understand and verbalized appropriate recall of all information.

## 2020-05-14 NOTE — ANESTHESIA POSTPROCEDURE EVALUATION
Anesthesia Post Evaluation    Patient: Juana Jean    Procedure(s) Performed: Procedure(s) (LRB):   SECTION (N/A)    Final Anesthesia Type: epidural    Patient location during evaluation: labor & delivery  Patient participation: Yes- Able to Participate  Level of consciousness: awake and alert  Post-procedure vital signs: reviewed and stable  Pain management: adequate  Airway patency: patent    PONV status at discharge: No PONV  Anesthetic complications: no      Cardiovascular status: blood pressure returned to baseline and stable  Respiratory status: unassisted and room air  Hydration status: euvolemic  Follow-up needed           Vitals Value Taken Time   BP 91/52 2020  2:41 AM   Temp 36.9 °C (98.5 °F) 2020  2:01 AM   Pulse 100 2020  2:41 AM   Resp 18 2020  2:01 AM   SpO2 96 % 2020  2:39 AM   Vitals shown include unvalidated device data.      No case tracking events are documented in the log.      Pain/Brent Score: Pain Rating Prior to Med Admin: 6 (2020  9:00 AM)  Pain Rating Post Med Admin: 0 (2020 10:00 AM)

## 2020-05-15 PROCEDURE — 12000002 HC ACUTE/MED SURGE SEMI-PRIVATE ROOM

## 2020-05-15 PROCEDURE — 25000003 PHARM REV CODE 250: Performed by: SPECIALIST

## 2020-05-15 RX ORDER — DOCUSATE SODIUM 100 MG/1
200 CAPSULE, LIQUID FILLED ORAL 2 TIMES DAILY
Qty: 28 CAPSULE | Refills: 0
Start: 2020-05-15 | End: 2020-09-11

## 2020-05-15 RX ORDER — OXYCODONE AND ACETAMINOPHEN 5; 325 MG/1; MG/1
1 TABLET ORAL EVERY 4 HOURS PRN
Qty: 25 TABLET | Refills: 0 | Status: SHIPPED | OUTPATIENT
Start: 2020-05-15 | End: 2020-09-11

## 2020-05-15 RX ORDER — IBUPROFEN 600 MG/1
600 TABLET ORAL EVERY 6 HOURS
Qty: 28 TABLET | Refills: 0
Start: 2020-05-15 | End: 2020-09-11

## 2020-05-15 RX ADMIN — DIPHENHYDRAMINE HYDROCHLORIDE 25 MG: 25 CAPSULE ORAL at 04:05

## 2020-05-15 RX ADMIN — IBUPROFEN 600 MG: 200 TABLET, FILM COATED ORAL at 06:05

## 2020-05-15 RX ADMIN — DIPHENHYDRAMINE HYDROCHLORIDE 25 MG: 25 CAPSULE ORAL at 11:05

## 2020-05-15 RX ADMIN — LEVOTHYROXINE SODIUM 200 MCG: 100 TABLET ORAL at 06:05

## 2020-05-15 RX ADMIN — OXYCODONE HYDROCHLORIDE AND ACETAMINOPHEN 1 TABLET: 5; 325 TABLET ORAL at 04:05

## 2020-05-15 RX ADMIN — IBUPROFEN 600 MG: 200 TABLET, FILM COATED ORAL at 11:05

## 2020-05-15 RX ADMIN — SERTRALINE HYDROCHLORIDE 25 MG: 25 TABLET ORAL at 08:05

## 2020-05-15 RX ADMIN — IBUPROFEN 600 MG: 200 TABLET, FILM COATED ORAL at 12:05

## 2020-05-15 RX ADMIN — OXYCODONE HYDROCHLORIDE AND ACETAMINOPHEN 1 TABLET: 5; 325 TABLET ORAL at 11:05

## 2020-05-15 RX ADMIN — PRENATAL VIT W/ FE FUMARATE-FA TAB 27-0.8 MG 1 TABLET: 27-0.8 TAB at 08:05

## 2020-05-15 NOTE — LACTATION NOTE
This note was copied from a baby's chart.  Assisted with position & latch. No interest @ the breast. Baby very sleepy. Mom hand expressed a few drops into baby's mouth. Instructed to keep baby skin to skin, once baby shows feeding cues to place baby to the breast. Assistance offered prn. Mom verbalized understanding

## 2020-05-15 NOTE — DISCHARGE SUMMARY
[facility]  Obstetrics  Discharge Summary      Patient Name: Juana Jean  MRN: [id number]  Admission Date: 2020  Hospital Length of Stay: 3 days  Discharge Date and Time:  05/15/2020 7:53 AM  Attending Physician: Placido Eden MD   Discharging Provider: Mac Eden MD   Primary Care Provider: Mac Eden MD    HPI: No notes on file        Procedure(s) (LRB):   SECTION (N/A)     Hospital Course:   29-year-old  1 para 0 at 40 weeks and 2 days gestational age admitted for induction of labor  Patient progressed to a maximum cervical dilation of 6 cm, and along with nonreassuring fetal heart tracing decision was made to proceed with a primary  section, please see operative note for details.  Postoperatively patient did well and by postop day 2.  Patient is requesting discharge to home, patient states pain is well controlled, bleeding is minimal, patient is ambulating and urinating without difficulty.  Physical exam on discharge is significant for an abdomen which is soft and nontender, uterus is firm and below the umbilicus, incision clean dry and intact, lochia minimal, extremities with no calf tenderness      Final Active Diagnoses:    Diagnosis Date Noted POA    PRINCIPAL PROBLEM:  Encounter for induction of labor [Z34.90] 2020 Not Applicable      Problems Resolved During this Admission:        Significant Diagnostic Studies: Labs:   CBC   Recent Labs   Lab 20  0417   WBC 23.54*   HGB 12.2   HCT 36.2*        Lab Results   Component Value Date    GROUPTRH B POS 2020         Feeding Method: breast    Immunizations     Date Immunization Status Dose Route/Site Given by    05/15/20 0430 Tdap Incomplete 0.5 mL Intramuscular/Left deltoid           Delivery:    Episiotomy:     Lacerations:     Repair suture:     Repair # of packets:     Blood loss (ml):       Birth information:  YOB: 2021   Time of birth: 1:28 AM   Sex:  female   Delivery type: , Low Transverse   Gestational Age: 40w3d    Delivery Clinician:      Other providers:       Additional  information:  Forceps:    Vacuum:    Breech:    Observed anomalies      Living?:           APGARS  One minute Five minutes Ten minutes   Skin color:         Heart rate:         Grimace:         Muscle tone:         Breathing:         Totals: 8  9        Placenta: Delivered:       appearance    Pending Diagnostic Studies:     Procedure Component Value Units Date/Time    RPR [[id number]] Collected:  20    Order Status:  Sent Lab Status:  In process Updated:  20    Specimen:  Blood           Discharged Condition: good    Disposition: Home or Self Care    Follow Up:  Follow-up Information     Mac Eden MD In 2 weeks.    Specialty:  Obstetrics and Gynecology  Contact information:  [address]  [place], [place] [facility] [address]  [phone]                 Patient Instructions:      Diet Adult Regular     Other restrictions (specify):   Order Comments: Pelvic rest x6 weeks     Notify your health care provider if you experience any of the following:  temperature >100.4     Notify your health care provider if you experience any of the following:  persistent nausea and vomiting or diarrhea     Notify your health care provider if you experience any of the following:  severe uncontrolled pain     Notify your health care provider if you experience any of the following:  redness, tenderness, or signs of infection (pain, swelling, redness, odor or green/yellow discharge around incision site)     Notify your health care provider if you experience any of the following:  difficulty breathing or increased cough     Notify your health care provider if you experience any of the following:  severe persistent headache     Notify your health care provider if you experience any of the following:  worsening rash     Pelvic Rest     No driving until:      Activity as tolerated     Medications:  Current Discharge Medication List      START taking these medications    Details   docusate sodium (COLACE) 100 MG capsule Take 2 capsules (200 mg total) by mouth 2 (two) times daily.  Refills: 0      ibuprofen (ADVIL,MOTRIN) 600 MG tablet Take 1 tablet (600 mg total) by mouth every 6 (six) hours.  Refills: 0      lanolin Crea cream Apply topically continuous prn for Dry Skin (Apply to nipples for soreness).  Refills: 0      oxyCODONE-acetaminophen (PERCOCET) 5-325 mg per tablet Take 1 tablet by mouth every 4 (four) hours as needed.  Qty: 25 tablet, Refills: 0    Comments: Quantity prescribed more than 7 day supply? No         CONTINUE these medications which have NOT CHANGED    Details   prenatal vit/iron fum/folic ac (PRENATAL 1+1 ORAL) Take 1 tablet by mouth once daily.      sertraline (ZOLOFT) 25 MG tablet Take 25 mg by mouth once daily.      levothyroxine (SYNTHROID) 150 MCG tablet Take 1 tablet by mouth once daily.  Qty: 30 tablet, Refills: 3         STOP taking these medications       valACYclovir (VALTREX) 500 MG tablet Comments:   Reason for Stopping:         ampicillin (PRINCIPEN) 500 MG capsule Comments:   Reason for Stopping:         fluconazole (DIFLUCAN) 150 MG Tab Comments:   Reason for Stopping:               Mac Eden MD  Obstetrics  [facility]

## 2020-05-15 NOTE — LACTATION NOTE
This note was copied from a baby's chart.  Assisted with position & latch. Instructed mom to compress breast for a deeper latch. Good nutritive sucking & swallowing noted. Discussed sore nipple management. Assistance offered prn. Mom verbalized understanding

## 2020-05-15 NOTE — PLAN OF CARE
Patient in no apparent distress. VSS. Ambulating and voiding without difficulty. Breastfeeding with assistance, getting better with each session. No acute events this shift. No additional needs at this time.

## 2020-05-16 VITALS
TEMPERATURE: 98 F | DIASTOLIC BLOOD PRESSURE: 86 MMHG | HEIGHT: 64 IN | RESPIRATION RATE: 18 BRPM | OXYGEN SATURATION: 95 % | HEART RATE: 63 BPM | BODY MASS INDEX: 34.15 KG/M2 | WEIGHT: 200 LBS | SYSTOLIC BLOOD PRESSURE: 129 MMHG

## 2020-05-16 PROCEDURE — 63600175 PHARM REV CODE 636 W HCPCS: Performed by: SPECIALIST

## 2020-05-16 PROCEDURE — 90471 IMMUNIZATION ADMIN: CPT | Performed by: SPECIALIST

## 2020-05-16 PROCEDURE — 90715 TDAP VACCINE 7 YRS/> IM: CPT | Performed by: SPECIALIST

## 2020-05-16 PROCEDURE — 25000003 PHARM REV CODE 250: Performed by: SPECIALIST

## 2020-05-16 RX ADMIN — PRENATAL VIT W/ FE FUMARATE-FA TAB 27-0.8 MG 1 TABLET: 27-0.8 TAB at 08:05

## 2020-05-16 RX ADMIN — LEVOTHYROXINE SODIUM 200 MCG: 100 TABLET ORAL at 05:05

## 2020-05-16 RX ADMIN — SERTRALINE HYDROCHLORIDE 25 MG: 25 TABLET ORAL at 08:05

## 2020-05-16 RX ADMIN — CLOSTRIDIUM TETANI TOXOID ANTIGEN (FORMALDEHYDE INACTIVATED), CORYNEBACTERIUM DIPHTHERIAE TOXOID ANTIGEN (FORMALDEHYDE INACTIVATED), BORDETELLA PERTUSSIS TOXOID ANTIGEN (GLUTARALDEHYDE INACTIVATED), BORDETELLA PERTUSSIS FILAMENTOUS HEMAGGLUTININ ANTIGEN (FORMALDEHYDE INACTIVATED), BORDETELLA PERTUSSIS PERTACTIN ANTIGEN, AND BORDETELLA PERTUSSIS FIMBRIAE 2/3 ANTIGEN 0.5 ML: 5; 2; 2.5; 5; 3; 5 INJECTION, SUSPENSION INTRAMUSCULAR at 10:05

## 2020-05-16 RX ADMIN — IBUPROFEN 600 MG: 200 TABLET, FILM COATED ORAL at 05:05

## 2020-05-16 RX ADMIN — DOCUSATE SODIUM 200 MG: 100 CAPSULE, LIQUID FILLED ORAL at 08:05

## 2020-05-16 NOTE — PLAN OF CARE
05/16/20 1102   Final Note   Assessment Type Final Discharge Note   Anticipated Discharge Disposition Home

## 2020-05-16 NOTE — DISCHARGE INSTRUCTIONS
REMOVE YOUR ABDOMINAL DRESSING ON YOUR OWN IN ONE WEEK AND THEN   FOLLOW UP WITH YOUR DOCTOR IN 2 WEEKS OR SOONER FOR ANY PROBLEMS.    Pelvic rest for 6 weeks (no sex, tampons, douching, nothing in the vagina)   You can experience vaginal bleeding on and off for up to 6 weeks, it will gradually get lighter and the color will change from bright red to a brownish discharge towards the end.     Activity:   NO strenuous activities or exercising for 6 weeks. Do not /lift anything over 15 pounds and no heavy housework or cleaning for 6 weeks. Limit stair climbing to twice a day during the first 2 weeks.   NO driving for 4 weeks. You may take short car trips but do not drive.   You may shower ONLY for the first 2 weeks, after 2 weeks you can soak in a bathtub. Use a mild soap, no heavy perfumes or fragrances to avoid irritation.   Walking frequently following a  delivery promotes healing and decreases pain associated with gas.   If constipation develops: You may take Colace (stool softener), Milk of Magnesia, Dulcolax or Miralax. All of these medications are sold over the counter.   Incision Care:   Clean your incision with mild soap & warm water only- do not scrub- let warm water run over it, then pat dry.     Pain Relief:   You may take Motrin for mild pain & uterine cramping.     Emotional Changes:   You may experience baby blues after delivery. You may feel let down, anxious and cry easily. This is normal. These feelings can begin 2-3 days after delivery and usually disappear in about a week or two. Prolonged sadness may indicate postpartum depression.       ***SEEK IMMEDIATE HELP FROM THE EMERGENCY ROOM IF YOU HAVE ANY CHEST PAIN OR DIFFICULTY BREATHING.    Call your doctor for any of the following:   Problems with any of your medications, inability to eat.   Foul smelling vaginal discharge.   If you notice pus-like drainage from your incision, if your incision or the area around it becomes hot or  swollen, or if you notice a foul smelling odor.   Temperature above 100.4.   Heavy vaginal bleeding. All women bleed different after delivery and each delivery is different. Heavy bleeding consists of saturating a gela pad in a 1 hour time period. Passing clots are normal, if you pass a blood clot larger than the size of a golf ball call your doctor's office.   If you experience pain in your legs/calves, if one leg increases in size and becomes swollen or becomes hot to touch or discolored.   Crying or periods of sadness beyond 2 weeks      If you are breast feeding:   Wash your breasts with mild soap and warm water.   You should wear a supportive bra.   You should continue to take a prenatal vitamin for 6 weeks or until breastfeeding is discontinued.   If nipples are sore, apply a few drops of breast milk after nursing and let air dry or you can use Lanolin cream.   If breasts are engorged, apply warmth and express milk.   [facility] lactation consultant is available at [phone] for your breastfeeding needs.    If you are not breastfeeding:   Wear a tight bra and do not stimulate your breasts. Avoid handling your breasts and do not express milk. You may apply ice packs or cabbage leaves to relieve discomfort from engorgement. If your breasts become warm to touch, reddened or lumps develop call your doctor.          Breastfeeding Discharge Instructions       [facility] Breastfeeding Support Services [phone]   AAP recommendation of exclusive breastfeeding for the first 6 months of life and continued breastfeeding with the introduction of supplemental foods beyond the first year of life.  Instructed on the recommendation to delay all bottle and pacifier use until after 4 weeks of age and breastfeeding is well established.  Discussed the benefits of exclusive breastfeeding for both mother and baby.  Discussed the risks of supplementation/pacifier use on the exclusivity of breastfeeding in the first 6  months. Feed the baby at the earliest sign of hunger or comfort  o Hands to mouth, sucking motions  o Rooting or searching for something to suck on  o Dont wait for crying - it is a not a late sign of hunger; it is a sign of distress     The feedings may be 8-12 times per 24hrs and will not follow a schedule   Alternate the breast you start the feeding with, or start with the breast that feels the fullest   Switch breasts when the baby takes himself off the breast or falls asleep   Keep offering breasts until the baby looks full, no longer gives hunger signs, and stays asleep when placed on his back in the crib   If the baby is sleepy and wont wake for a feeding, put the baby skin-to-skin dressed in a diaper against the mothers bare chest   Sleep near your baby   The baby should be positioned and latched on to the breast correctly  o Chest-to-chest, chin in the breast  o Babys lips are flipped outward  o Babys mouth is stretched open wide like a shout  o Babys sucking should feel like tugging to the mother  - The baby should be drinking at the breast:  o You should hear swallowing or gulping throughout the feeding  o You should see milk on the babys lips when he comes off the breast  o Your breasts should be softer when the baby is finished feeding  o The baby should look relaxed at the end of feedings  o After the 4th day and your milk is in:  o The babys poop should turn bright yellow and be loose, watery, and seedy  o The baby should have at least 3-4 poops the size of the palm of your hand per day  o The baby should have at least 6-8 wet diapers per day  o The urine should be light yellow in color  You should drink when you are thirsty and eat a healthy diet when you are    hungry.     Take naps to get the rest you need.   Take medications and/or drink alcohol only with permission of your obstetrician    or the babys pediatrician.  You can also call the [facility],   ([phone]),  Monday-Friday, 8am-5pm Central time, to get the most   up-to-date evidence-based information on the use of medications during   pregnancy and breastfeeding.      The baby should be examined by a pediatrician at 3-5 days of age; unless ordered sooner by the pediatrician.  Once your milk comes in, the baby should be back to birth weight no later than 10-14 days of age.    If youre having problems with breastfeeding or have any questions regarding breastfeeding- call [facility] Breastfeeding Support services [phone]

## 2020-07-24 ENCOUNTER — OFFICE VISIT (OUTPATIENT)
Dept: PODIATRY | Facility: CLINIC | Age: 30
End: 2020-07-24
Payer: COMMERCIAL

## 2020-07-24 VITALS
DIASTOLIC BLOOD PRESSURE: 71 MMHG | BODY MASS INDEX: 27.31 KG/M2 | TEMPERATURE: 97 F | HEIGHT: 64 IN | SYSTOLIC BLOOD PRESSURE: 108 MMHG | WEIGHT: 160 LBS | HEART RATE: 77 BPM

## 2020-07-24 DIAGNOSIS — M79.675 TOE PAIN, LEFT: ICD-10-CM

## 2020-07-24 DIAGNOSIS — M79.674 PAIN OF TOE OF RIGHT FOOT: ICD-10-CM

## 2020-07-24 DIAGNOSIS — L60.0 INGROWN TOENAIL OF LEFT FOOT: Primary | ICD-10-CM

## 2020-07-24 DIAGNOSIS — L60.0 INGROWN TOENAIL OF RIGHT FOOT: ICD-10-CM

## 2020-07-24 PROCEDURE — 11750 NAIL REMOVAL: ICD-10-PCS | Mod: 51,TA,S$GLB, | Performed by: PODIATRIST

## 2020-07-24 PROCEDURE — 3008F BODY MASS INDEX DOCD: CPT | Mod: S$GLB,,, | Performed by: PODIATRIST

## 2020-07-24 PROCEDURE — 3008F PR BODY MASS INDEX (BMI) DOCUMENTED: ICD-10-PCS | Mod: S$GLB,,, | Performed by: PODIATRIST

## 2020-07-24 PROCEDURE — 99203 OFFICE O/P NEW LOW 30 MIN: CPT | Mod: 25,S$GLB,, | Performed by: PODIATRIST

## 2020-07-24 PROCEDURE — 11750 EXCISION NAIL&NAIL MATRIX: CPT | Mod: 51,TA,S$GLB, | Performed by: PODIATRIST

## 2020-07-24 PROCEDURE — 99203 PR OFFICE/OUTPT VISIT, NEW, LEVL III, 30-44 MIN: ICD-10-PCS | Mod: 25,S$GLB,, | Performed by: PODIATRIST

## 2020-07-24 RX ORDER — CEPHALEXIN 500 MG/1
500 CAPSULE ORAL EVERY 12 HOURS
Qty: 10 CAPSULE | Refills: 0 | Status: SHIPPED | OUTPATIENT
Start: 2020-07-24 | End: 2020-07-29

## 2020-07-24 RX ORDER — CEPHALEXIN 500 MG/1
500 CAPSULE ORAL EVERY 12 HOURS
Qty: 14 CAPSULE | Refills: 0 | Status: CANCELLED | OUTPATIENT
Start: 2020-07-24 | End: 2020-07-31

## 2020-07-24 RX ORDER — DEXTROAMPHETAMINE SACCHARATE, AMPHETAMINE ASPARTATE, DEXTROAMPHETAMINE SULFATE AND AMPHETAMINE SULFATE 5; 5; 5; 5 MG/1; MG/1; MG/1; MG/1
TABLET ORAL
COMMUNITY
Start: 2020-06-04 | End: 2020-09-11

## 2020-07-24 NOTE — PATIENT INSTRUCTIONS

## 2020-07-24 NOTE — PROGRESS NOTES
[address] [facility] [address]  [place] [address]  Phone: [phone]   [phone]    Patient's PCP:Mac Eden MD  Referring Provider: No ref. provider found    Subjective:      Chief Complaint:: Ingrown Toenail (Left and right great toes)    HPI  Juana Jean is a 30 y.o. female who presents with a complaint of  Ingrown toenail left first toe lateral border lasting for a week and a half. Current symptoms include pain and redness.  Aggravating factors are closed toe shoes, walking and standing. Symptoms have progressed.Treatment to date have included trimmed, epson salt soaks and pedicures. Patients rates pain 4/10 on pain scale.Yesterday 8/10.  Patient also complains of ingrowing of the right great toenail.  States pain to palpation particularly of the lateral border.        Vitals:    20 0937   BP: 108/71   Pulse: 77   Temp: 97.3 °F (36.3 °C)     Shoe Size: 8.5    Past Surgical History:   Procedure Laterality Date     SECTION N/A 2020    Procedure:  SECTION;  Surgeon: Placido Eden MD;  Location: [facility] L[facility]D;  Service: OB/GYN;  Laterality: N/A;    WISDOM TOOTH EXTRACTION       Past Medical History:   Diagnosis Date    Mental disorder     anxiety    Thyroid disease      Family History   Problem Relation Age of Onset    Heart disease Mother     Hyperlipidemia Mother     Diabetes Mother     Heart disease Father     Hyperlipidemia Father     Diabetes Sister         Social History:   Marital Status:   Alcohol History:  reports previous alcohol use.  Tobacco History:  reports that she has never smoked. She does not have any smokeless tobacco history on file.  Drug History:  reports no history of drug use.    Review of patient's allergies indicates:  No Known Allergies    Current Outpatient Medications   Medication Sig Dispense Refill    cephALEXin (KEFLEX) 500 MG capsule Take 1 capsule (500 mg total) by mouth every 12 (twelve) hours. for 5 days 10 capsule  0    dextroamphetamine-amphetamine (ADDERALL) 20 mg tablet       docusate sodium (COLACE) 100 MG capsule Take 2 capsules (200 mg total) by mouth 2 (two) times daily. 28 capsule 0    FLUoxetine 20 MG capsule Take 1 oral capsule once a day 30 capsule 3    ibuprofen (ADVIL,MOTRIN) 600 MG tablet Take 1 tablet (600 mg total) by mouth every 6 (six) hours. 28 tablet 0    lanolin Crea cream Apply topically continuous prn for Dry Skin (Apply to nipples for soreness).  0    levothyroxine (SYNTHROID) 150 MCG tablet Take 1 tablet by mouth once daily. (Patient taking differently: Take 200 mcg by mouth before breakfast. ) 30 tablet 3    levothyroxine (SYNTHROID) 200 MCG tablet Take one tab daily 30 tablet 3    oxyCODONE-acetaminophen (PERCOCET) 5-325 mg per tablet Take 1 tablet by mouth every 4 (four) hours as needed. 25 tablet 0    prenatal vit/iron fum/folic ac (PRENATAL 1+1 ORAL) Take 1 tablet by mouth once daily.      sertraline (ZOLOFT) 25 MG tablet Take 25 mg by mouth once daily.       No current facility-administered medications for this visit.        Review of Systems   Constitutional: Negative for chills, fatigue, fever and unexpected weight change.   HENT: Negative for hearing loss and trouble swallowing.    Eyes: Negative for photophobia and visual disturbance.   Respiratory: Negative for cough, shortness of breath and wheezing.    Cardiovascular: Negative for chest pain, palpitations and leg swelling.   Gastrointestinal: Negative for abdominal pain and nausea.   Genitourinary: Negative for dysuria and frequency.   Musculoskeletal: Negative for arthralgias, back pain and joint swelling.   Skin: Negative for rash.   Neurological: Negative for tremors, seizures, weakness, numbness and headaches.   Hematological: Does not bruise/bleed easily.         Objective:        Physical Exam:   Foot Exam    General  General Appearance: appears stated age and healthy   Orientation: alert and oriented to person, place, and  time   Affect: appropriate   Gait: unimpaired       Right Foot/Ankle     Inspection and Palpation  Ecchymosis: none  Tenderness: (Medial lateral borders great toenail)  Swelling: (Lateral border great toenail)  Arch: normal  Skin Exam: erythema;     Neurovascular  Dorsalis pedis: 2+  Posterior tibial: 2+  Saphenous nerve sensation: normal  Tibial nerve sensation: normal  Superficial peroneal nerve sensation: normal  Deep peroneal nerve sensation: normal  Sural nerve sensation: normal    Muscle Strength  Ankle dorsiflexion: 5  Ankle plantar flexion: 5  Ankle inversion: 5  Ankle eversion: 5  Great toe extension: 5  Great toe flexion: 5    Range of Motion    Normal right ankle ROM    Comments  Ingrown medial and lateral borders of the great toenail.  Tender to palpation.  Mild erythema is present along the lateral nail border. Scant purulence seen on the lateral border.    Left Foot/Ankle      Inspection and Palpation  Ecchymosis: none  Tenderness: (Medial and lateral borders great toenail)  Swelling: (Lateral border great toenail)  Arch: normal  Skin Exam: erythema;     Neurovascular  Dorsalis pedis: 2+  Posterior tibial: 2+  Saphenous nerve sensation: normal  Tibial nerve sensation: normal  Superficial peroneal nerve sensation: normal  Deep peroneal nerve sensation: normal  Sural nerve sensation: normal    Muscle Strength  Ankle dorsiflexion: 5  Ankle plantar flexion: 5  Ankle inversion: 5  Ankle eversion: 5  Great toe extension: 5  Great toe flexion: 5    Range of Motion    Normal left ankle ROM    Comments  Ingrown medial and lateral borders of the great toenail.  Tender to palpation.  Mild erythema is present along the lateral nail border. Scant purulence seen on the lateral border.      Physical Exam   Cardiovascular:   Pulses:       Dorsalis pedis pulses are 2+ on the right side and 2+ on the left side.        Posterior tibial pulses are 2+ on the right side and 2+ on the left side.   Feet:   Right Foot:   Skin  Integrity: Positive for erythema.   Left Foot:   Skin Integrity: Positive for erythema.       Imaging: none           Assessment:       1. Ingrown toenail of left foot    2. Ingrown toenail of right foot    3. Toe pain, left    4. Pain of toe of right foot      Plan:   Ingrown toenail of left foot  -     Nail Removal  -     Nail Removal  -     Cancel: SURGITUBE FOR HOME USE  -     cephALEXin (KEFLEX) 500 MG capsule; Take 1 capsule (500 mg total) by mouth every 12 (twelve) hours. for 5 days  Dispense: 10 capsule; Refill: 0    Ingrown toenail of right foot  -     Nail Removal  -     Nail Removal  -     Cancel: SURGITUBE FOR HOME USE  -     cephALEXin (KEFLEX) 500 MG capsule; Take 1 capsule (500 mg total) by mouth every 12 (twelve) hours. for 5 days  Dispense: 10 capsule; Refill: 0    Toe pain, left  -     Nail Removal  -     Nail Removal  -     Cancel: SURGITUBE FOR HOME USE  -     cephALEXin (KEFLEX) 500 MG capsule; Take 1 capsule (500 mg total) by mouth every 12 (twelve) hours. for 5 days  Dispense: 10 capsule; Refill: 0    Pain of toe of right foot  -     Nail Removal  -     Nail Removal  -     Cancel: SURGITUBE FOR HOME USE  -     cephALEXin (KEFLEX) 500 MG capsule; Take 1 capsule (500 mg total) by mouth every 12 (twelve) hours. for 5 days  Dispense: 10 capsule; Refill: 0      Follow up in about 2 weeks (around 8/7/2020), or if symptoms worsen or fail to improve.    Nail Removal    Date/Time: 7/24/2020 9:40 AM  Performed by: Foreign Pettit DPM  Authorized by: Foreign Pettit DPM     Consent Done?:  Yes (Written)    Location:  Left foot  Location detail:  Left big toe  Anesthesia:  Local infiltration  Local anesthetic: lidocaine 2% without epinephrine  Preparation:  Skin prepped with alcohol    Amount removed:  Partial  Wedge excision of skin of nail fold: No    Nail bed sutured?: No    Nail matrix removed:  Partial  Dressing applied:  4x4  Patient tolerance:  Patient tolerated the procedure well with no immediate  complications     Medial and lateral borders  Nail Removal    Date/Time: 7/24/2020 9:40 AM  Performed by: Foreign Pettit DPM  Authorized by: Foreign Pettit DPM     Consent Done?:  Yes (Written)    Location:  Right foot  Location detail:  Right big toe  Anesthesia:  Local infiltration  Local anesthetic: lidocaine 2% without epinephrine  Preparation:  Skin prepped with alcohol    Amount removed:  Partial  Wedge excision of skin of nail fold: No    Nail bed sutured?: No    Nail matrix removed:  Partial  Dressing applied:  4x4  Patient tolerance:  Patient tolerated the procedure well with no immediate complications     Medial and lateral borders         Counseling:     I provided patient education verbally regarding:   Patient diagnosis, treatment options, as well as alternatives, risks, and benefits.     Ingrown toenail treatment options of no treatment, avulsion of nail border under local with regrowth of nail, chemical matrixectomy for attempted permanent correction of the problem. Patient was educated about daily dressing changes, soaks, and medications following removal of the nail.       This note was created using Dragon voice recognition software that occasionally misinterpreted phrases or words.

## 2020-08-03 ENCOUNTER — LAB VISIT (OUTPATIENT)
Dept: PRIMARY CARE CLINIC | Facility: CLINIC | Age: 30
End: 2020-08-03
Payer: COMMERCIAL

## 2020-08-03 DIAGNOSIS — Z03.818 ENCOUNTER FOR OBSERVATION FOR SUSPECTED EXPOSURE TO OTHER BIOLOGICAL AGENTS RULED OUT: ICD-10-CM

## 2020-08-03 PROCEDURE — U0003 INFECTIOUS AGENT DETECTION BY NUCLEIC ACID (DNA OR RNA); SEVERE ACUTE RESPIRATORY SYNDROME CORONAVIRUS 2 (SARS-COV-2) (CORONAVIRUS DISEASE [COVID-19]), AMPLIFIED PROBE TECHNIQUE, MAKING USE OF HIGH THROUGHPUT TECHNOLOGIES AS DESCRIBED BY CMS-2020-01-R: HCPCS

## 2020-08-05 LAB — SARS-COV-2 RNA RESP QL NAA+PROBE: NOT DETECTED

## 2020-08-24 ENCOUNTER — LAB VISIT (OUTPATIENT)
Dept: LAB | Facility: HOSPITAL | Age: 30
End: 2020-08-24
Attending: INTERNAL MEDICINE
Payer: COMMERCIAL

## 2020-08-24 DIAGNOSIS — E04.1 NONTOXIC UNINODULAR GOITER: ICD-10-CM

## 2020-08-24 DIAGNOSIS — E06.5 HYPOTHYROIDISM DUE TO FIBROUS INVASIVE THYROIDITIS: ICD-10-CM

## 2020-08-24 DIAGNOSIS — R00.2 PALPITATIONS: Primary | ICD-10-CM

## 2020-08-24 DIAGNOSIS — E03.8 HYPOTHYROIDISM DUE TO FIBROUS INVASIVE THYROIDITIS: ICD-10-CM

## 2020-08-24 LAB
ALBUMIN SERPL BCP-MCNC: 4 G/DL (ref 3.5–5.2)
ALP SERPL-CCNC: 87 U/L (ref 55–135)
ALT SERPL W/O P-5'-P-CCNC: 24 U/L (ref 10–44)
ANION GAP SERPL CALC-SCNC: 10 MMOL/L (ref 8–16)
AST SERPL-CCNC: 21 U/L (ref 10–40)
BASOPHILS # BLD AUTO: 0.06 K/UL (ref 0–0.2)
BASOPHILS NFR BLD: 0.7 % (ref 0–1.9)
BILIRUB SERPL-MCNC: 0.6 MG/DL (ref 0.1–1)
BUN SERPL-MCNC: 16 MG/DL (ref 6–20)
CALCIUM SERPL-MCNC: 9.6 MG/DL (ref 8.7–10.5)
CHLORIDE SERPL-SCNC: 102 MMOL/L (ref 95–110)
CO2 SERPL-SCNC: 27 MMOL/L (ref 23–29)
CREAT SERPL-MCNC: 0.8 MG/DL (ref 0.5–1.4)
DIFFERENTIAL METHOD: NORMAL
EOSINOPHIL # BLD AUTO: 0.4 K/UL (ref 0–0.5)
EOSINOPHIL NFR BLD: 4.5 % (ref 0–8)
ERYTHROCYTE [DISTWIDTH] IN BLOOD BY AUTOMATED COUNT: 12.6 % (ref 11.5–14.5)
EST. GFR  (AFRICAN AMERICAN): >60 ML/MIN/1.73 M^2
EST. GFR  (NON AFRICAN AMERICAN): >60 ML/MIN/1.73 M^2
GLUCOSE SERPL-MCNC: 83 MG/DL (ref 70–110)
HCT VFR BLD AUTO: 45.9 % (ref 37–48.5)
HGB BLD-MCNC: 14.8 G/DL (ref 12–16)
IMM GRANULOCYTES # BLD AUTO: 0.02 K/UL (ref 0–0.04)
IMM GRANULOCYTES NFR BLD AUTO: 0.2 % (ref 0–0.5)
LYMPHOCYTES # BLD AUTO: 1.8 K/UL (ref 1–4.8)
LYMPHOCYTES NFR BLD: 21.5 % (ref 18–48)
MCH RBC QN AUTO: 29 PG (ref 27–31)
MCHC RBC AUTO-ENTMCNC: 32.2 G/DL (ref 32–36)
MCV RBC AUTO: 90 FL (ref 82–98)
MONOCYTES # BLD AUTO: 0.6 K/UL (ref 0.3–1)
MONOCYTES NFR BLD: 6.5 % (ref 4–15)
NEUTROPHILS # BLD AUTO: 5.6 K/UL (ref 1.8–7.7)
NEUTROPHILS NFR BLD: 66.6 % (ref 38–73)
NRBC BLD-RTO: 0 /100 WBC
PLATELET # BLD AUTO: 295 K/UL (ref 150–350)
PMV BLD AUTO: 10.7 FL (ref 9.2–12.9)
POTASSIUM SERPL-SCNC: 3.9 MMOL/L (ref 3.5–5.1)
PROT SERPL-MCNC: 7.3 G/DL (ref 6–8.4)
RBC # BLD AUTO: 5.1 M/UL (ref 4–5.4)
SODIUM SERPL-SCNC: 139 MMOL/L (ref 136–145)
T4 FREE SERPL-MCNC: 1.05 NG/DL (ref 0.71–1.51)
TSH SERPL DL<=0.005 MIU/L-ACNC: 0.01 UIU/ML (ref 0.34–5.6)
WBC # BLD AUTO: 8.46 K/UL (ref 3.9–12.7)

## 2020-08-24 PROCEDURE — 80053 COMPREHEN METABOLIC PANEL: CPT

## 2020-08-24 PROCEDURE — 84439 ASSAY OF FREE THYROXINE: CPT

## 2020-08-24 PROCEDURE — 85025 COMPLETE CBC W/AUTO DIFF WBC: CPT

## 2020-08-24 PROCEDURE — 36415 COLL VENOUS BLD VENIPUNCTURE: CPT

## 2020-08-24 PROCEDURE — 84443 ASSAY THYROID STIM HORMONE: CPT

## 2020-09-08 ENCOUNTER — LAB VISIT (OUTPATIENT)
Dept: LAB | Facility: HOSPITAL | Age: 30
End: 2020-09-08
Attending: INTERNAL MEDICINE
Payer: COMMERCIAL

## 2020-09-08 DIAGNOSIS — E05.90 PRETIBIAL MYXEDEMA: Primary | ICD-10-CM

## 2020-09-08 DIAGNOSIS — E05.90 HYPERTHYROIDISM: ICD-10-CM

## 2020-09-08 LAB
T4 FREE SERPL-MCNC: 0.86 NG/DL (ref 0.71–1.51)
TSH SERPL DL<=0.005 MIU/L-ACNC: 0.38 UIU/ML (ref 0.34–5.6)

## 2020-09-08 PROCEDURE — 84480 ASSAY TRIIODOTHYRONINE (T3): CPT

## 2020-09-08 PROCEDURE — 84439 ASSAY OF FREE THYROXINE: CPT

## 2020-09-08 PROCEDURE — 84443 ASSAY THYROID STIM HORMONE: CPT

## 2020-09-09 LAB — T3 SERPL-MCNC: 65 NG/DL (ref 71–180)

## 2020-09-11 ENCOUNTER — OFFICE VISIT (OUTPATIENT)
Dept: FAMILY MEDICINE | Facility: CLINIC | Age: 30
End: 2020-09-11
Payer: COMMERCIAL

## 2020-09-11 VITALS
TEMPERATURE: 99 F | HEART RATE: 66 BPM | BODY MASS INDEX: 30.39 KG/M2 | HEIGHT: 64 IN | DIASTOLIC BLOOD PRESSURE: 80 MMHG | WEIGHT: 178 LBS | SYSTOLIC BLOOD PRESSURE: 128 MMHG | OXYGEN SATURATION: 98 %

## 2020-09-11 DIAGNOSIS — L50.9 URTICARIA: ICD-10-CM

## 2020-09-11 DIAGNOSIS — F90.9 ATTENTION DEFICIT HYPERACTIVITY DISORDER (ADHD), UNSPECIFIED ADHD TYPE: ICD-10-CM

## 2020-09-11 DIAGNOSIS — E03.9 HYPOTHYROIDISM, UNSPECIFIED TYPE: ICD-10-CM

## 2020-09-11 DIAGNOSIS — F32.A DEPRESSION, UNSPECIFIED DEPRESSION TYPE: ICD-10-CM

## 2020-09-11 DIAGNOSIS — R23.2 FLUSHING: ICD-10-CM

## 2020-09-11 DIAGNOSIS — E05.80 IATROGENIC HYPERTHYROIDISM: Primary | ICD-10-CM

## 2020-09-11 PROCEDURE — 3008F BODY MASS INDEX DOCD: CPT | Mod: CPTII,S$GLB,, | Performed by: FAMILY MEDICINE

## 2020-09-11 PROCEDURE — 99204 PR OFFICE/OUTPT VISIT, NEW, LEVL IV, 45-59 MIN: ICD-10-PCS | Mod: S$GLB,,, | Performed by: FAMILY MEDICINE

## 2020-09-11 PROCEDURE — 3008F PR BODY MASS INDEX (BMI) DOCUMENTED: ICD-10-PCS | Mod: CPTII,S$GLB,, | Performed by: FAMILY MEDICINE

## 2020-09-11 PROCEDURE — 99204 OFFICE O/P NEW MOD 45 MIN: CPT | Mod: S$GLB,,, | Performed by: FAMILY MEDICINE

## 2020-09-11 RX ORDER — NORETHINDRONE ACETATE AND ETHINYL ESTRADIOL, ETHINYL ESTRADIOL AND FERROUS FUMARATE 1MG-10(24)
1 KIT ORAL DAILY
COMMUNITY
Start: 2020-08-24 | End: 2020-10-02

## 2020-09-11 RX ORDER — CIMETIDINE 400 MG/1
400 TABLET, FILM COATED ORAL 2 TIMES DAILY
Qty: 60 TABLET | Refills: 2 | Status: SHIPPED | OUTPATIENT
Start: 2020-09-11 | End: 2020-10-02

## 2020-09-11 RX ORDER — CIMETIDINE 400 MG/1
400 TABLET, FILM COATED ORAL 2 TIMES DAILY
COMMUNITY
End: 2020-09-11 | Stop reason: SDUPTHER

## 2020-09-12 PROBLEM — F32.A DEPRESSION: Status: ACTIVE | Noted: 2020-09-12

## 2020-09-12 PROBLEM — F90.9 ATTENTION DEFICIT HYPERACTIVITY DISORDER (ADHD): Status: ACTIVE | Noted: 2020-09-12

## 2020-09-12 PROBLEM — E03.9 HYPOTHYROIDISM: Status: ACTIVE | Noted: 2020-09-12

## 2020-09-12 NOTE — PROGRESS NOTES
30-year-old with history of hypothyroidism.  Delivered child by  4 months ago.  Thyroid dose was increased during the pregnancy of course to 200 per day.  Was on 150 before pregnancy.  Started feeling as if the thyroid was too high so held her does been half weeks ago after checking her level and found the TSH was low.  The free T4 was normal at that 1 of 1.4.  Thereabouts.  Held it for a while and then is restarted at 1:50 a.m. now.  She has been having rashes starting 1st on her arms now moving to her legs.  She has been costa.  She has also been flushed reddened face.  And some increased heart rate.  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  Slow the heart rate down weight initially decreased 30 lb postpartum been stabilized and now is gone up about 10 lb the past couple of weeks.  Current TSH is come back up some.  CBC and CMP were done on  normal.  S change birth control pill since the pregnancy.  No real palpitations.  The rash is been a constant.  But it does increase in decreased some.  On the thighs arms it is very pruritic.  Started on the arms 1st.  ADD she has been off of her medications secondary to the high heart rate cetera.  History of hypothyroidism.  History of attention deficit disorder.  Also some history of depression.  And is now on Prozac.  Was on Zoloft during the pregnancy.  Back on the Prozac for few months.  She also has had    Physical examination vital signs are noted.  Well-developed well-nourished female no acute distress at this time.  Somewhat distraught over the current situation.  Neck is without bruit no adenopathy.  Chest is clear to auscultation no wheezes crackles no dyspnea.  Heart regular gallop or rub.  Abdomen bowel sounds are positive soft nontender no hepatosplenomegaly no guarding or rebound.  Extremities are without edema.  Erythematous rash over the medial arms and forearms antecubital area.  No whelps.    Impression  hyperthyroidism iatrogenic.  Hypothyroidism.  Flushing.  Diarrhea.  Attention deficit disorder.  Depression.  A little diarrhea.    Plan urinalysis done no protein.  Continue to 150 of the thyroid medication.  Get a 24 hour urine for 5 hydroxyindoleacetic acid.  Continue Zyrtec 10 mg daily that she is taking now.  Add Tagamet 400 b.i.d. number 60 with 2 refills..  Continue Benadryl p.r.n..  Get a BNP sed rate CRP ARNULFO and rheumatoid factor.  We will have to repeat the thyroid function studies in a few weeks.

## 2020-09-16 ENCOUNTER — TELEPHONE (OUTPATIENT)
Dept: FAMILY MEDICINE | Facility: CLINIC | Age: 30
End: 2020-09-16

## 2020-09-16 NOTE — TELEPHONE ENCOUNTER
----- Message from Justin Cano III, MD sent at 9/14/2020  8:55 PM CDT -----  NORMAL followup as needed.    Pt advised labs ok.

## 2020-09-21 ENCOUNTER — PATIENT MESSAGE (OUTPATIENT)
Dept: ALLERGY | Facility: CLINIC | Age: 30
End: 2020-09-21

## 2020-09-21 ENCOUNTER — OFFICE VISIT (OUTPATIENT)
Dept: ALLERGY | Facility: CLINIC | Age: 30
End: 2020-09-21
Payer: COMMERCIAL

## 2020-09-21 DIAGNOSIS — E03.9 HYPOTHYROIDISM, UNSPECIFIED TYPE: ICD-10-CM

## 2020-09-21 DIAGNOSIS — L50.8 CHRONIC URTICARIA: Primary | ICD-10-CM

## 2020-09-21 PROCEDURE — 99203 OFFICE O/P NEW LOW 30 MIN: CPT | Mod: S$GLB,,, | Performed by: ALLERGY & IMMUNOLOGY

## 2020-09-21 PROCEDURE — 99203 PR OFFICE/OUTPT VISIT, NEW, LEVL III, 30-44 MIN: ICD-10-PCS | Mod: S$GLB,,, | Performed by: ALLERGY & IMMUNOLOGY

## 2020-09-21 RX ORDER — CETIRIZINE HYDROCHLORIDE 10 MG/1
10 TABLET ORAL DAILY
COMMUNITY
End: 2022-07-01

## 2020-09-21 RX ORDER — DOXEPIN HYDROCHLORIDE 10 MG/1
10 CAPSULE ORAL NIGHTLY
Qty: 30 CAPSULE | Refills: 11 | Status: SHIPPED | OUTPATIENT
Start: 2020-09-21 | End: 2020-10-02 | Stop reason: SDUPTHER

## 2020-09-21 RX ORDER — DIPHENHYDRAMINE HCL 25 MG
25 CAPSULE ORAL EVERY 6 HOURS PRN
COMMUNITY
End: 2022-01-28

## 2020-09-21 RX ORDER — LEVOCETIRIZINE DIHYDROCHLORIDE 5 MG/1
10 TABLET, FILM COATED ORAL 2 TIMES DAILY
Qty: 120 TABLET | Refills: 6 | Status: SHIPPED | OUTPATIENT
Start: 2020-09-21 | End: 2022-07-01

## 2020-09-21 RX ORDER — LIDOCAINE 50 MG/G
OINTMENT TOPICAL
Qty: 240 G | Refills: 3 | Status: SHIPPED | OUTPATIENT
Start: 2020-09-21 | End: 2022-01-28

## 2020-09-21 RX ORDER — HYDROXYZINE PAMOATE 25 MG/1
25 CAPSULE ORAL 4 TIMES DAILY
COMMUNITY
End: 2022-01-28

## 2020-09-21 RX ORDER — FAMOTIDINE 20 MG/1
20 TABLET, FILM COATED ORAL 2 TIMES DAILY
Qty: 60 TABLET | Refills: 3 | Status: SHIPPED | OUTPATIENT
Start: 2020-09-21 | End: 2022-07-01

## 2020-09-21 RX ORDER — HYDROXYZINE HYDROCHLORIDE 25 MG/1
25 TABLET, FILM COATED ORAL 4 TIMES DAILY PRN
Qty: 30 TABLET | Refills: 0 | Status: SHIPPED | OUTPATIENT
Start: 2020-09-21 | End: 2020-10-08 | Stop reason: SDUPTHER

## 2020-09-21 NOTE — PROGRESS NOTES
Subjective:       Patient ID: Juana Jean is a 30 y.o. female.    Chief Complaint: Rash (unexplained itchy rash x 8-9 weeks. Only new med was oral contraceptive, stopped it and no improvement. )    HPI     Pt presents as a new patient for chronic urticaria.     Pt gave birth in May 2020.  Since that time , she was on OCP   This was d/c but her urticaria is still constant   Daily   Trunk and extremities  Taking hydroxyzine- at night , benadryl- BID-TID, zyrtec - 10 mg q day- BID.   No dark marks or scaring.     Tagamet - not helpful     3-4 weeks ago had steroid injection that helped but then had steroid w/d and rebound.     thyroid disease and on synthroid   Family history of thyroid disease.           Component      Latest Ref Rng & Units 9/8/2020 8/24/2020   TSH      0.340 - 5.600 uIU/mL 0.380 0.010 (L)           Review of Systems    General: neg unexpected weight changes, fevers, chills, night sweats, malaise  HEENT: see hpi, Neg eye pain, vision changes, ear drainage, nose bleeds, throat tightness, sores in the mouth  CV: Neg chest pain, palpitations, swelling  Resp: see hpi, neg shortness of breath, hemoptysis, cough  GI: see hpi, neg dysphagia, night abdominal pain, reflux, chronic diarrhea, chronic constipation  Derm: See Hpi, neg new rash, neg flushing  Mu/sk: Neg joint pain, joint swelling   Psych: Neg anxiety  neuro: neg chronic headaches, muscle weakness  Endo: neg heat/cold intolerance, chronic fatigue    Objective:   There were no vitals filed for this visit.     Physical Exam    General: no acute distress, well developed well nourished   Skin: erythematous plaques on trunk bilateral arms and legs.   Lymph: neg supraclavicular, axillary     Assessment:       1. Chronic urticaria    2. Hypothyroidism, unspecified type        Plan:       Chronic urticaria  -     doxepin (SINEQUAN) 10 MG capsule; Take 1 capsule (10 mg total) by mouth every evening.  Dispense: 30 capsule; Refill: 11 -      levocetirizine (XYZAL) 5 MG tablet; Take 2 tablets (10 mg total) by mouth 2 (two) times a day.  Dispense: 120 tablet; Refill: 6  -     famotidine (PEPCID) 20 MG tablet; Take 1 tablet (20 mg total) by mouth 2 (two) times daily.  Dispense: 60 tablet; Refill: 3  -     lidocaine (XYLOCAINE) 5 % Oint ointment; Apply topically as needed.  Dispense: 240 g; Refill: 3    Hypothyroidism, unspecified type            Start on high dose antihistamines   Action plan reviewed  Continue moise 0.1 % BID-TID  Continue sarna     topical lidocaine prn       Consider xolair if above is not working.   May have to be at infusion center.       Follow in 4-6 weeks        Juana Goldman M.D.  Allergy/Immunology  [facility]   [phone] phone  [phone] fax

## 2020-09-22 ENCOUNTER — TELEPHONE (OUTPATIENT)
Dept: ALLERGY | Facility: CLINIC | Age: 30
End: 2020-09-22

## 2020-09-22 DIAGNOSIS — L50.8 CHRONIC URTICARIA: Primary | ICD-10-CM

## 2020-09-22 RX ORDER — OMALIZUMAB 202.5 MG/1.4ML
300 INJECTION, SOLUTION SUBCUTANEOUS
Qty: 2 VIAL | Refills: 11 | Status: SHIPPED | OUTPATIENT
Start: 2020-09-22 | End: 2022-01-28

## 2020-09-22 RX ORDER — PREDNISONE 10 MG/1
TABLET ORAL
Qty: 17 TABLET | Refills: 0 | Status: SHIPPED | OUTPATIENT
Start: 2020-09-22 | End: 2020-10-09

## 2020-09-22 NOTE — TELEPHONE ENCOUNTER
"Pt states she is "miserable"     Pt would like to start xolair and I will send prednisone taper.         Juana Goldman M.D.  Allergy/Immunology  [facility]   [phone] phone  [phone] fax      "

## 2020-09-23 ENCOUNTER — TELEPHONE (OUTPATIENT)
Dept: PHARMACY | Facility: CLINIC | Age: 30
End: 2020-09-23

## 2020-09-25 ENCOUNTER — PATIENT MESSAGE (OUTPATIENT)
Dept: ALLERGY | Facility: CLINIC | Age: 30
End: 2020-09-25

## 2020-09-25 NOTE — TELEPHONE ENCOUNTER
MD Office message:    Hamlet Goldman and Staff,    [facility] received prescription for Xolair Syr. Upon calling the patients insurance company, we have been told that this medication is not covered under the patient's pharmacy benefits.  [facility] is unable to bill medical claims for medications.     This medication may be available under the patient's medical benefit with a prior authorization. The medication itself, and the administration of the medication, will both have to be billed under the medical benefit. · Please contact [facility] with any questions at [phone]     Thank you,     Zoila Anderson, PCA [facility]   [phone]

## 2020-10-02 ENCOUNTER — PATIENT MESSAGE (OUTPATIENT)
Dept: ALLERGY | Facility: CLINIC | Age: 30
End: 2020-10-02

## 2020-10-02 ENCOUNTER — OFFICE VISIT (OUTPATIENT)
Dept: FAMILY MEDICINE | Facility: CLINIC | Age: 30
End: 2020-10-02
Payer: COMMERCIAL

## 2020-10-02 ENCOUNTER — TELEPHONE (OUTPATIENT)
Dept: ALLERGY | Facility: CLINIC | Age: 30
End: 2020-10-02

## 2020-10-02 VITALS
BODY MASS INDEX: 30.39 KG/M2 | TEMPERATURE: 98 F | WEIGHT: 178 LBS | HEIGHT: 64 IN | OXYGEN SATURATION: 99 % | SYSTOLIC BLOOD PRESSURE: 132 MMHG | DIASTOLIC BLOOD PRESSURE: 84 MMHG | HEART RATE: 73 BPM

## 2020-10-02 DIAGNOSIS — L50.8 CHRONIC URTICARIA: Primary | ICD-10-CM

## 2020-10-02 DIAGNOSIS — E03.9 HYPOTHYROIDISM, UNSPECIFIED TYPE: ICD-10-CM

## 2020-10-02 DIAGNOSIS — F90.9 ATTENTION DEFICIT HYPERACTIVITY DISORDER (ADHD), UNSPECIFIED ADHD TYPE: ICD-10-CM

## 2020-10-02 DIAGNOSIS — E66.9 OBESITY, UNSPECIFIED CLASSIFICATION, UNSPECIFIED OBESITY TYPE, UNSPECIFIED WHETHER SERIOUS COMORBIDITY PRESENT: ICD-10-CM

## 2020-10-02 DIAGNOSIS — F32.A DEPRESSION, UNSPECIFIED DEPRESSION TYPE: ICD-10-CM

## 2020-10-02 PROCEDURE — 99214 PR OFFICE/OUTPT VISIT, EST, LEVL IV, 30-39 MIN: ICD-10-PCS | Mod: S$GLB,,, | Performed by: FAMILY MEDICINE

## 2020-10-02 PROCEDURE — 99214 OFFICE O/P EST MOD 30 MIN: CPT | Mod: S$GLB,,, | Performed by: FAMILY MEDICINE

## 2020-10-02 PROCEDURE — 3008F BODY MASS INDEX DOCD: CPT | Mod: CPTII,S$GLB,, | Performed by: FAMILY MEDICINE

## 2020-10-02 PROCEDURE — 3008F PR BODY MASS INDEX (BMI) DOCUMENTED: ICD-10-PCS | Mod: CPTII,S$GLB,, | Performed by: FAMILY MEDICINE

## 2020-10-02 RX ORDER — DOXEPIN HYDROCHLORIDE 10 MG/1
10 CAPSULE ORAL NIGHTLY
Qty: 60 CAPSULE | Refills: 2 | Status: SHIPPED | OUTPATIENT
Start: 2020-10-02 | End: 2021-10-02

## 2020-10-02 RX ORDER — THYROID 60 MG/1
60 TABLET ORAL
Qty: 30 TABLET | Refills: 2 | Status: SHIPPED | OUTPATIENT
Start: 2020-10-02 | End: 2020-10-05 | Stop reason: SDUPTHER

## 2020-10-02 RX ORDER — BUPROPION HYDROCHLORIDE 150 MG/1
150 TABLET ORAL DAILY
Qty: 30 TABLET | Refills: 0 | Status: SHIPPED | OUTPATIENT
Start: 2020-10-02 | End: 2021-10-02

## 2020-10-02 NOTE — TELEPHONE ENCOUNTER
[employer] called regarding the PA request for xolair.  They are very confused of why we are trying to file under the medical benefit instead of the pharmacy.    I tried to explain that this is an exclusion on pharmacy benefits.  She is going to escalate this to management as she does not know how to handle this situation.

## 2020-10-02 NOTE — PROGRESS NOTES
Therapy Plan created for patient to have [facility] 300mg [facility].

## 2020-10-03 ENCOUNTER — PATIENT MESSAGE (OUTPATIENT)
Dept: FAMILY MEDICINE | Facility: CLINIC | Age: 30
End: 2020-10-03

## 2020-10-04 NOTE — PROGRESS NOTES
Follow-up of her urticaria.  Saw Dr. Lillima COATES  Placed her on doxy PN.  Using H2 blockers.  Still itching rashes still very bad.  She is to start on Xolair.  She is off the hormones now.  Still itching.  Needs Prozac but it would like to change medications just in case it is causing itching and rash.  Her levothyroxine is at 1:50 a.m. now she was on armor thyroid before her pregnancy and would like to go back to that also in case it is causing a problem.  She is flushing a little less since she is off the birth control pills.  She is unable exercise because any exercises tend to cause her to itch even more.  She is taking Pepcid Atarax Xyzal doxepin in the Prozac very upset about the itching in the weight gain.    Physical examination vital signs are noted.  Somewhat overweight female.  Distressed.  Neck is without adenopathy no thyromegaly.  Chest clear to auscultation no wheezes crackles or dyspnea.  Heart regular rate rhythm without murmur gallop or rub.  Abdomen bowel sounds positive soft nontender no guarding or rebound.  Extremities are without edema.  Some redness of the arms forearms.    Impression urticaria.  Hypothyroidism.  ADHD.  Depression.  Obesity.    Plan discontinue levothyroxine go back to [facility] Thyroid 60 mg daily 30 with 2 refills.  Will need to check a TSH in about 2 months.  Discontinue the Prozac.  Start Wellbutrin  will help her with depression and probably help the ADHD symptoms to some extent 2.  Increase her doxy PND 20 mg HS 10 x 2 prescription for 60 with 2 refills.  Follow-up in about a month.

## 2020-10-05 ENCOUNTER — PATIENT MESSAGE (OUTPATIENT)
Dept: ALLERGY | Facility: CLINIC | Age: 30
End: 2020-10-05

## 2020-10-05 RX ORDER — THYROID 90 MG/1
90 TABLET ORAL
Qty: 30 TABLET | Refills: 2 | Status: SHIPPED | OUTPATIENT
Start: 2020-10-05 | End: 2021-09-25 | Stop reason: ALTCHOICE

## 2020-10-14 ENCOUNTER — PATIENT MESSAGE (OUTPATIENT)
Dept: ALLERGY | Facility: CLINIC | Age: 30
End: 2020-10-14

## 2020-10-19 ENCOUNTER — TELEPHONE (OUTPATIENT)
Dept: FAMILY MEDICINE | Facility: CLINIC | Age: 30
End: 2020-10-19

## 2020-10-22 RX ORDER — BUPROPION HYDROCHLORIDE 150 MG/1
150 TABLET ORAL DAILY
Qty: 30 TABLET | Refills: 0 | OUTPATIENT
Start: 2020-10-22 | End: 2021-10-22

## 2020-10-26 ENCOUNTER — PATIENT MESSAGE (OUTPATIENT)
Dept: ALLERGY | Facility: CLINIC | Age: 30
End: 2020-10-26

## 2020-10-28 RX ORDER — ONDANSETRON HYDROCHLORIDE 8 MG/1
8 TABLET, FILM COATED ORAL EVERY 8 HOURS PRN
COMMUNITY
End: 2020-10-28 | Stop reason: SDUPTHER

## 2020-10-30 ENCOUNTER — DOCUMENTATION ONLY (OUTPATIENT)
Dept: ALLERGY | Facility: CLINIC | Age: 30
End: 2020-10-30

## 2020-10-30 ENCOUNTER — PATIENT MESSAGE (OUTPATIENT)
Dept: ALLERGY | Facility: CLINIC | Age: 30
End: 2020-10-30

## 2020-11-02 RX ORDER — ONDANSETRON HYDROCHLORIDE 8 MG/1
8 TABLET, FILM COATED ORAL EVERY 8 HOURS PRN
Qty: 20 TABLET | Refills: 1 | Status: SHIPPED | OUTPATIENT
Start: 2020-11-02 | End: 2022-01-28

## 2020-11-09 ENCOUNTER — OFFICE VISIT (OUTPATIENT)
Dept: URGENT CARE | Facility: CLINIC | Age: 30
End: 2020-11-09
Payer: COMMERCIAL

## 2020-11-09 ENCOUNTER — PATIENT MESSAGE (OUTPATIENT)
Dept: ALLERGY | Facility: CLINIC | Age: 30
End: 2020-11-09

## 2020-11-09 VITALS
RESPIRATION RATE: 18 BRPM | HEART RATE: 99 BPM | DIASTOLIC BLOOD PRESSURE: 82 MMHG | OXYGEN SATURATION: 96 % | TEMPERATURE: 98 F | SYSTOLIC BLOOD PRESSURE: 112 MMHG

## 2020-11-09 DIAGNOSIS — R50.9 FEVER, UNSPECIFIED FEVER CAUSE: ICD-10-CM

## 2020-11-09 DIAGNOSIS — Z20.822 EXPOSURE TO COVID-19 VIRUS: Primary | ICD-10-CM

## 2020-11-09 LAB
CTP QC/QA: YES
CTP QC/QA: YES
FLUAV AG NPH QL: NEGATIVE
FLUBV AG NPH QL: NEGATIVE
SARS-COV-2 RDRP RESP QL NAA+PROBE: NEGATIVE

## 2020-11-09 PROCEDURE — 87804 INFLUENZA ASSAY W/OPTIC: CPT | Mod: QW,,, | Performed by: NURSE PRACTITIONER

## 2020-11-09 PROCEDURE — 99204 OFFICE O/P NEW MOD 45 MIN: CPT | Mod: S$GLB,,, | Performed by: NURSE PRACTITIONER

## 2020-11-09 PROCEDURE — U0002 COVID-19 LAB TEST NON-CDC: HCPCS | Mod: QW,S$GLB,, | Performed by: NURSE PRACTITIONER

## 2020-11-09 PROCEDURE — U0002: ICD-10-PCS | Mod: QW,S$GLB,, | Performed by: NURSE PRACTITIONER

## 2020-11-09 PROCEDURE — 99204 PR OFFICE/OUTPT VISIT, NEW, LEVL IV, 45-59 MIN: ICD-10-PCS | Mod: S$GLB,,, | Performed by: NURSE PRACTITIONER

## 2020-11-09 PROCEDURE — 87804 POCT INFLUENZA A/B: ICD-10-PCS | Mod: QW,,, | Performed by: NURSE PRACTITIONER

## 2020-11-09 NOTE — PROGRESS NOTES
Subjective:       Patient ID: Juana Jean is a 30 y.o. female.    Vitals:  temperature is 98.3 °F (36.8 °C). Her blood pressure is 112/82 and her pulse is 99. Her respiration is 18 and oxygen saturation is 96%.     Chief Complaint: No chief complaint on file.    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 Negative for chills.   HENT: Positive for congestion and postnasal drip. Negative for sore throat.    Neck: Negative for painful lymph nodes.   Respiratory: Negative for cough.    Gastrointestinal: Negative for vomiting and diarrhea.   Musculoskeletal: Negative for muscle ache.   Skin: Negative for rash.   Neurological: Negative for headaches and seizures.   Hematologic/Lymphatic: Negative for swollen lymph nodes.       Objective:      Physical Exam   Constitutional: She is oriented to person, place, and time. She appears well-developed.   HENT:   Head: Normocephalic and atraumatic.   Mouth/Throat: Oropharynx is clear and moist.   Eyes: Pupils are equal, round, and reactive to light. Conjunctivae and EOM are normal.   Neck: Normal range of motion. Neck supple.   Cardiovascular: Normal rate, regular rhythm and normal heart sounds.   Pulmonary/Chest: Effort normal and breath sounds normal.   Musculoskeletal: Normal range of motion.   Neurological: She is alert and oriented to person, place, and time.   Skin: Skin is warm and dry. Psychiatric: Her behavior is normal.   Nursing note and vitals reviewed.        Assessment:       1. Exposure to COVID-19 virus    2. Fever, unspecified fever cause        Plan:         Office Visit on 11/09/2020   Component Date Value Ref Range Status    POC Rapid COVID 11/09/2020 Negative  Negative Final     Acceptable 11/09/2020 Yes   Final    Rapid Influenza A Ag 11/09/2020 Negative  Negative  Final    Rapid Influenza B Ag 11/09/2020 Negative  Negative Final     Acceptable 11/09/2020 Yes   Final   negative flu and covid tests, patient notified in clinic    Exposure to COVID-19 virus  -     POCT COVID-19 Rapid Screening    Fever, unspecified fever cause  -     POCT Influenza A/B

## 2020-11-12 ENCOUNTER — TELEPHONE (OUTPATIENT)
Dept: FAMILY MEDICINE | Facility: CLINIC | Age: 30
End: 2020-11-12

## 2020-11-18 ENCOUNTER — IMMUNIZATION (OUTPATIENT)
Dept: PHARMACY | Facility: CLINIC | Age: 30
End: 2020-11-18
Payer: COMMERCIAL

## 2020-11-20 ENCOUNTER — TELEPHONE (OUTPATIENT)
Dept: FAMILY MEDICINE | Facility: CLINIC | Age: 30
End: 2020-11-20

## 2020-12-04 ENCOUNTER — LAB VISIT (OUTPATIENT)
Dept: LAB | Facility: HOSPITAL | Age: 30
End: 2020-12-04
Attending: INTERNAL MEDICINE
Payer: COMMERCIAL

## 2020-12-04 ENCOUNTER — PATIENT MESSAGE (OUTPATIENT)
Dept: ALLERGY | Facility: CLINIC | Age: 30
End: 2020-12-04

## 2020-12-04 ENCOUNTER — TELEPHONE (OUTPATIENT)
Dept: CARDIOLOGY | Facility: CLINIC | Age: 30
End: 2020-12-04

## 2020-12-04 DIAGNOSIS — Z34.90 PRENATAL CARE, ANTEPARTUM: ICD-10-CM

## 2020-12-04 DIAGNOSIS — Z01.84 ANTIBODY RESPONSE EXAMINATION: ICD-10-CM

## 2020-12-04 DIAGNOSIS — Z34.90 PRENATAL CARE, ANTEPARTUM: Primary | ICD-10-CM

## 2020-12-04 LAB — HCG INTACT+B SERPL-ACNC: <0.6 MIU/ML

## 2020-12-04 PROCEDURE — 36415 COLL VENOUS BLD VENIPUNCTURE: CPT

## 2020-12-04 PROCEDURE — 84702 CHORIONIC GONADOTROPIN TEST: CPT

## 2020-12-16 ENCOUNTER — PATIENT MESSAGE (OUTPATIENT)
Dept: ALLERGY | Facility: CLINIC | Age: 30
End: 2020-12-16

## 2020-12-17 ENCOUNTER — IMMUNIZATION (OUTPATIENT)
Dept: PRIMARY CARE CLINIC | Facility: CLINIC | Age: 30
End: 2020-12-17
Payer: COMMERCIAL

## 2020-12-17 DIAGNOSIS — Z23 NEED FOR VACCINATION: ICD-10-CM

## 2020-12-17 PROCEDURE — 91300 COVID-19, MRNA, LNP-S, PF, 30 MCG/0.3 ML DOSE VACCINE: ICD-10-PCS | Mod: S$GLB,,, | Performed by: FAMILY MEDICINE

## 2020-12-17 PROCEDURE — 0001A COVID-19, MRNA, LNP-S, PF, 30 MCG/0.3 ML DOSE VACCINE: CPT | Mod: CV19,S$GLB,, | Performed by: FAMILY MEDICINE

## 2020-12-17 PROCEDURE — 91300 COVID-19, MRNA, LNP-S, PF, 30 MCG/0.3 ML DOSE VACCINE: CPT | Mod: S$GLB,,, | Performed by: FAMILY MEDICINE

## 2020-12-17 PROCEDURE — 0001A COVID-19, MRNA, LNP-S, PF, 30 MCG/0.3 ML DOSE VACCINE: ICD-10-PCS | Mod: CV19,S$GLB,, | Performed by: FAMILY MEDICINE

## 2021-01-07 ENCOUNTER — IMMUNIZATION (OUTPATIENT)
Dept: PRIMARY CARE CLINIC | Facility: CLINIC | Age: 31
End: 2021-01-07
Payer: COMMERCIAL

## 2021-01-07 DIAGNOSIS — Z23 NEED FOR VACCINATION: ICD-10-CM

## 2021-01-07 PROCEDURE — 0002A COVID-19, MRNA, LNP-S, PF, 30 MCG/0.3 ML DOSE VACCINE: ICD-10-PCS | Mod: S$GLB,,, | Performed by: FAMILY MEDICINE

## 2021-01-07 PROCEDURE — 91300 COVID-19, MRNA, LNP-S, PF, 30 MCG/0.3 ML DOSE VACCINE: ICD-10-PCS | Mod: S$GLB,,, | Performed by: FAMILY MEDICINE

## 2021-01-07 PROCEDURE — 0002A COVID-19, MRNA, LNP-S, PF, 30 MCG/0.3 ML DOSE VACCINE: CPT | Mod: S$GLB,,, | Performed by: FAMILY MEDICINE

## 2021-01-07 PROCEDURE — 91300 COVID-19, MRNA, LNP-S, PF, 30 MCG/0.3 ML DOSE VACCINE: CPT | Mod: S$GLB,,, | Performed by: FAMILY MEDICINE

## 2021-01-08 RX ORDER — BUPROPION HYDROCHLORIDE 300 MG/1
TABLET ORAL
Qty: 90 TABLET | Refills: 0 | OUTPATIENT
Start: 2021-01-08

## 2021-02-01 ENCOUNTER — PATIENT MESSAGE (OUTPATIENT)
Dept: ALLERGY | Facility: CLINIC | Age: 31
End: 2021-02-01

## 2021-02-01 ENCOUNTER — PATIENT MESSAGE (OUTPATIENT)
Dept: FAMILY MEDICINE | Facility: CLINIC | Age: 31
End: 2021-02-01

## 2021-02-02 DIAGNOSIS — E03.9 HYPOTHYROIDISM, UNSPECIFIED TYPE: Primary | ICD-10-CM

## 2021-02-28 ENCOUNTER — OFFICE VISIT (OUTPATIENT)
Dept: URGENT CARE | Facility: CLINIC | Age: 31
End: 2021-02-28
Payer: COMMERCIAL

## 2021-02-28 VITALS
HEART RATE: 73 BPM | OXYGEN SATURATION: 98 % | TEMPERATURE: 97 F | SYSTOLIC BLOOD PRESSURE: 114 MMHG | DIASTOLIC BLOOD PRESSURE: 84 MMHG

## 2021-02-28 DIAGNOSIS — R19.7 DIARRHEA, UNSPECIFIED TYPE: ICD-10-CM

## 2021-02-28 DIAGNOSIS — R53.83 FATIGUE, UNSPECIFIED TYPE: Primary | ICD-10-CM

## 2021-02-28 DIAGNOSIS — Z20.822 EXPOSURE TO COVID-19 VIRUS: ICD-10-CM

## 2021-02-28 PROCEDURE — U0002: ICD-10-PCS | Mod: QW,S$GLB,, | Performed by: NURSE PRACTITIONER

## 2021-02-28 PROCEDURE — 99214 OFFICE O/P EST MOD 30 MIN: CPT | Mod: 25,S$GLB,, | Performed by: NURSE PRACTITIONER

## 2021-02-28 PROCEDURE — U0002 COVID-19 LAB TEST NON-CDC: HCPCS | Mod: QW,S$GLB,, | Performed by: NURSE PRACTITIONER

## 2021-02-28 PROCEDURE — 99214 PR OFFICE/OUTPT VISIT, EST, LEVL IV, 30-39 MIN: ICD-10-PCS | Mod: 25,S$GLB,, | Performed by: NURSE PRACTITIONER

## 2021-02-28 PROCEDURE — 87804 INFLUENZA ASSAY W/OPTIC: CPT | Mod: QW,,, | Performed by: NURSE PRACTITIONER

## 2021-02-28 PROCEDURE — 87804 POCT INFLUENZA A/B: ICD-10-PCS | Mod: QW,,, | Performed by: NURSE PRACTITIONER

## 2021-02-28 RX ORDER — VALACYCLOVIR HYDROCHLORIDE 1 G/1
2000 TABLET, FILM COATED ORAL 2 TIMES DAILY
Qty: 4 TABLET | Refills: 0 | Status: SHIPPED | OUTPATIENT
Start: 2021-02-28 | End: 2022-02-23 | Stop reason: SDUPTHER

## 2021-03-22 ENCOUNTER — PATIENT MESSAGE (OUTPATIENT)
Dept: FAMILY MEDICINE | Facility: CLINIC | Age: 31
End: 2021-03-22

## 2021-04-29 ENCOUNTER — PATIENT MESSAGE (OUTPATIENT)
Dept: FAMILY MEDICINE | Facility: CLINIC | Age: 31
End: 2021-04-29

## 2021-05-25 ENCOUNTER — PATIENT MESSAGE (OUTPATIENT)
Dept: FAMILY MEDICINE | Facility: CLINIC | Age: 31
End: 2021-05-25

## 2021-06-28 ENCOUNTER — LAB VISIT (OUTPATIENT)
Dept: LAB | Facility: HOSPITAL | Age: 31
End: 2021-06-28
Attending: FAMILY MEDICINE
Payer: COMMERCIAL

## 2021-06-28 ENCOUNTER — PATIENT MESSAGE (OUTPATIENT)
Dept: FAMILY MEDICINE | Facility: CLINIC | Age: 31
End: 2021-06-28

## 2021-06-28 DIAGNOSIS — E03.9 HYPOTHYROIDISM, UNSPECIFIED TYPE: ICD-10-CM

## 2021-06-28 LAB
CHOLEST SERPL-MCNC: 204 MG/DL (ref 120–199)
CHOLEST/HDLC SERPL: 3.6 {RATIO} (ref 2–5)
HDLC SERPL-MCNC: 57 MG/DL (ref 40–75)
HDLC SERPL: 27.9 % (ref 20–50)
LDLC SERPL CALC-MCNC: 134 MG/DL (ref 63–159)
NONHDLC SERPL-MCNC: 147 MG/DL
T4 FREE SERPL-MCNC: 0.97 NG/DL (ref 0.71–1.51)
TRIGL SERPL-MCNC: 65 MG/DL (ref 30–150)
TSH SERPL DL<=0.005 MIU/L-ACNC: 1.58 UIU/ML (ref 0.34–5.6)

## 2021-06-28 PROCEDURE — 84443 ASSAY THYROID STIM HORMONE: CPT | Performed by: FAMILY MEDICINE

## 2021-06-28 PROCEDURE — 84439 ASSAY OF FREE THYROXINE: CPT | Performed by: FAMILY MEDICINE

## 2021-06-28 PROCEDURE — 80061 LIPID PANEL: CPT | Performed by: FAMILY MEDICINE

## 2021-06-28 PROCEDURE — 36415 COLL VENOUS BLD VENIPUNCTURE: CPT | Performed by: FAMILY MEDICINE

## 2021-07-12 ENCOUNTER — PATIENT MESSAGE (OUTPATIENT)
Dept: FAMILY MEDICINE | Facility: CLINIC | Age: 31
End: 2021-07-12

## 2021-07-28 ENCOUNTER — PATIENT MESSAGE (OUTPATIENT)
Dept: FAMILY MEDICINE | Facility: CLINIC | Age: 31
End: 2021-07-28

## 2021-08-03 ENCOUNTER — OCCUPATIONAL HEALTH (OUTPATIENT)
Dept: URGENT CARE | Facility: CLINIC | Age: 31
End: 2021-08-03

## 2021-08-03 ENCOUNTER — TELEPHONE (OUTPATIENT)
Dept: CARDIOLOGY | Facility: CLINIC | Age: 31
End: 2021-08-03

## 2021-08-03 DIAGNOSIS — R11.0 NAUSEA: Primary | ICD-10-CM

## 2021-08-03 DIAGNOSIS — R68.83 CHILLS: ICD-10-CM

## 2021-08-03 DIAGNOSIS — R05.9 COUGH: Primary | ICD-10-CM

## 2021-08-03 DIAGNOSIS — R51.9 NONINTRACTABLE HEADACHE, UNSPECIFIED CHRONICITY PATTERN, UNSPECIFIED HEADACHE TYPE: Primary | ICD-10-CM

## 2021-08-03 LAB
CTP QC/QA: YES
SARS-COV-2 RDRP RESP QL NAA+PROBE: NEGATIVE

## 2021-08-03 PROCEDURE — U0002: ICD-10-PCS | Mod: QW,S$GLB,, | Performed by: PREVENTIVE MEDICINE

## 2021-08-03 PROCEDURE — U0002 COVID-19 LAB TEST NON-CDC: HCPCS | Mod: QW,S$GLB,, | Performed by: PREVENTIVE MEDICINE

## 2021-08-05 ENCOUNTER — LAB VISIT (OUTPATIENT)
Dept: URGENT CARE | Facility: CLINIC | Age: 31
End: 2021-08-05
Payer: COMMERCIAL

## 2021-08-05 DIAGNOSIS — R51.9 HEAD ACHE: ICD-10-CM

## 2021-08-05 PROCEDURE — U0005 INFEC AGEN DETEC AMPLI PROBE: HCPCS | Performed by: EMERGENCY MEDICINE

## 2021-08-05 PROCEDURE — U0003 INFECTIOUS AGENT DETECTION BY NUCLEIC ACID (DNA OR RNA); SEVERE ACUTE RESPIRATORY SYNDROME CORONAVIRUS 2 (SARS-COV-2) (CORONAVIRUS DISEASE [COVID-19]), AMPLIFIED PROBE TECHNIQUE, MAKING USE OF HIGH THROUGHPUT TECHNOLOGIES AS DESCRIBED BY CMS-2020-01-R: HCPCS | Performed by: EMERGENCY MEDICINE

## 2021-08-06 LAB
SARS-COV-2 RNA RESP QL NAA+PROBE: NOT DETECTED
SARS-COV-2- CYCLE NUMBER: -1

## 2021-08-23 ENCOUNTER — OCCUPATIONAL HEALTH (OUTPATIENT)
Dept: URGENT CARE | Facility: CLINIC | Age: 31
End: 2021-08-23

## 2021-08-23 DIAGNOSIS — J02.9 SORE THROAT: ICD-10-CM

## 2021-08-23 DIAGNOSIS — R50.9 FEVER, UNSPECIFIED FEVER CAUSE: ICD-10-CM

## 2021-08-23 DIAGNOSIS — R52 BODY ACHES: Primary | ICD-10-CM

## 2021-08-23 LAB
CTP QC/QA: YES
SARS-COV-2 RDRP RESP QL NAA+PROBE: NEGATIVE

## 2021-08-23 PROCEDURE — U0002 COVID-19 LAB TEST NON-CDC: HCPCS | Mod: QW,S$GLB,, | Performed by: PREVENTIVE MEDICINE

## 2021-08-23 PROCEDURE — U0002: ICD-10-PCS | Mod: QW,S$GLB,, | Performed by: PREVENTIVE MEDICINE

## 2021-09-01 ENCOUNTER — PATIENT MESSAGE (OUTPATIENT)
Dept: FAMILY MEDICINE | Facility: CLINIC | Age: 31
End: 2021-09-01

## 2021-09-21 ENCOUNTER — PATIENT MESSAGE (OUTPATIENT)
Dept: FAMILY MEDICINE | Facility: CLINIC | Age: 31
End: 2021-09-21

## 2021-09-23 ENCOUNTER — PATIENT MESSAGE (OUTPATIENT)
Dept: FAMILY MEDICINE | Facility: CLINIC | Age: 31
End: 2021-09-23

## 2021-10-04 ENCOUNTER — PATIENT MESSAGE (OUTPATIENT)
Dept: FAMILY MEDICINE | Facility: CLINIC | Age: 31
End: 2021-10-04

## 2021-10-11 ENCOUNTER — IMMUNIZATION (OUTPATIENT)
Dept: PRIMARY CARE CLINIC | Facility: CLINIC | Age: 31
End: 2021-10-11
Payer: COMMERCIAL

## 2021-10-11 DIAGNOSIS — Z23 NEED FOR VACCINATION: Primary | ICD-10-CM

## 2021-10-11 PROCEDURE — 0003A COVID-19, MRNA, LNP-S, PF, 30 MCG/0.3 ML DOSE VACCINE: ICD-10-PCS | Mod: S$GLB,,, | Performed by: FAMILY MEDICINE

## 2021-10-11 PROCEDURE — 0003A COVID-19, MRNA, LNP-S, PF, 30 MCG/0.3 ML DOSE VACCINE: CPT | Mod: S$GLB,,, | Performed by: FAMILY MEDICINE

## 2021-10-11 PROCEDURE — 91300 COVID-19, MRNA, LNP-S, PF, 30 MCG/0.3 ML DOSE VACCINE: ICD-10-PCS | Mod: S$GLB,,, | Performed by: FAMILY MEDICINE

## 2021-10-11 PROCEDURE — 91300 COVID-19, MRNA, LNP-S, PF, 30 MCG/0.3 ML DOSE VACCINE: CPT | Mod: S$GLB,,, | Performed by: FAMILY MEDICINE

## 2021-12-10 RX ORDER — ETONOGESTREL AND ETHINYL ESTRADIOL VAGINAL RING .015; .12 MG/D; MG/D
1 RING VAGINAL
Qty: 3 EACH | Refills: 3 | Status: SHIPPED | OUTPATIENT
Start: 2021-12-10 | End: 2022-02-28 | Stop reason: SDUPTHER

## 2022-01-28 ENCOUNTER — OFFICE VISIT (OUTPATIENT)
Dept: ORTHOPEDICS | Facility: CLINIC | Age: 32
End: 2022-01-28
Payer: COMMERCIAL

## 2022-01-28 VITALS — WEIGHT: 155 LBS | HEIGHT: 64 IN | BODY MASS INDEX: 26.46 KG/M2

## 2022-01-28 DIAGNOSIS — M18.11 ARTHRITIS OF CARPOMETACARPAL (CMC) JOINT OF RIGHT THUMB: Primary | ICD-10-CM

## 2022-01-28 PROCEDURE — 1160F PR REVIEW ALL MEDS BY PRESCRIBER/CLIN PHARMACIST DOCUMENTED: ICD-10-PCS | Mod: S$GLB,,, | Performed by: PHYSICIAN ASSISTANT

## 2022-01-28 PROCEDURE — 3008F BODY MASS INDEX DOCD: CPT | Mod: S$GLB,,, | Performed by: PHYSICIAN ASSISTANT

## 2022-01-28 PROCEDURE — 3008F PR BODY MASS INDEX (BMI) DOCUMENTED: ICD-10-PCS | Mod: S$GLB,,, | Performed by: PHYSICIAN ASSISTANT

## 2022-01-28 PROCEDURE — 1160F RVW MEDS BY RX/DR IN RCRD: CPT | Mod: S$GLB,,, | Performed by: PHYSICIAN ASSISTANT

## 2022-01-28 PROCEDURE — 20600 SMALL JOINT ASPIRATION/INJECTION: R THUMB CMC: ICD-10-PCS | Mod: RT,S$GLB,, | Performed by: PHYSICIAN ASSISTANT

## 2022-01-28 PROCEDURE — 99203 OFFICE O/P NEW LOW 30 MIN: CPT | Mod: 25,S$GLB,, | Performed by: PHYSICIAN ASSISTANT

## 2022-01-28 PROCEDURE — 99203 PR OFFICE/OUTPT VISIT, NEW, LEVL III, 30-44 MIN: ICD-10-PCS | Mod: 25,S$GLB,, | Performed by: PHYSICIAN ASSISTANT

## 2022-01-28 PROCEDURE — 20600 DRAIN/INJ JOINT/BURSA W/O US: CPT | Mod: RT,S$GLB,, | Performed by: PHYSICIAN ASSISTANT

## 2022-01-28 RX ORDER — TRIAMCINOLONE ACETONIDE 40 MG/ML
40 INJECTION, SUSPENSION INTRA-ARTICULAR; INTRAMUSCULAR
Status: DISCONTINUED | OUTPATIENT
Start: 2022-01-28 | End: 2022-01-28 | Stop reason: HOSPADM

## 2022-01-28 RX ADMIN — TRIAMCINOLONE ACETONIDE 40 MG: 40 INJECTION, SUSPENSION INTRA-ARTICULAR; INTRAMUSCULAR at 08:01

## 2022-01-28 NOTE — PROGRESS NOTES
[facility] ORTHOPEDICS  [address]  [place] [address]  Phone: [phone]   [phone]    Patient's PCP: Mac Eden MD  Referring Provider: No ref. provider found    Subjective:      Chief Complaint:   Chief Complaint   Patient presents with    Right Hand - Pain     Right hand feeling better this morning, pain mainly in evening pain is primarily at the thumb       Past Medical History:   Diagnosis Date    Mental disorder     anxiety    Thyroid disease        Past Surgical History:   Procedure Laterality Date     SECTION N/A 2020    Procedure:  SECTION;  Surgeon: Placido Eden MD;  Location: [facility] L&D;  Service: OB/GYN;  Laterality: N/A;     SECTION      WISDOM TOOTH EXTRACTION         Current Outpatient Medications   Medication Sig    cetirizine (ZYRTEC) 10 MG tablet Take 10 mg by mouth once daily.    dextroamphetamine-amphetamine (ADDERALL) 20 mg tablet Take 1 tablet (20 mg total) by mouth 2 (two) times a day.    etonogestreL-ethinyl estradioL (NUVARING) 0.12-0.015 mg/24 hr vaginal ring Place 1 each vaginally every 28 days.    flu vacc [id number] 6mos up,PF, (FLUARIX QUAD [id number], PF,) 60 mcg (15 mcg x 4)/0.5 mL Syrg TO BE ADMINISTERED    FLUoxetine 20 MG capsule TAKE 1 CAPSULE BY MOUTH TWICE A DAY    levothyroxine (SYNTHROID) 150 MCG tablet TAKE 1 TABLET BY MOUTH EVERY DAY IN THE MORNING    levothyroxine (SYNTHROID) 150 MCG tablet Take 1 tablet by mouth once daily in the morning.    buPROPion (WELLBUTRIN XL) 300 MG 24 hr tablet Take by mouth once daily (Patient not taking: Reported on 2022)    diphenhydrAMINE (BENADRYL) 25 mg capsule Take 25 mg by mouth every 6 (six) hours as needed for Itching.    doxepin (SINEQUAN) 25 MG capsule Take 1 capsule by mouth every night as needed (Patient not taking: Reported on 2022)    famotidine (PEPCID) 20 MG tablet Take 1 tablet (20 mg total) by mouth 2 (two) times daily.    hydrOXYzine HCL  (ATARAX) 25 MG tablet Take 1 tablet by mouth 4 times daily as needed (Patient not taking: Reported on 1/28/2022)    hydrOXYzine pamoate (VISTARIL) 25 MG Cap Take 25 mg by mouth 4 (four) times daily.    levocetirizine (XYZAL) 5 MG tablet Take 2 tablets (10 mg total) by mouth 2 (two) times a day.    lidocaine (XYLOCAINE) 5 % Oint ointment Apply topically as needed. (Patient not taking: Reported on 1/28/2022)    omalizumab (XOLAIR) 150 mg injection Inject 300 mg into the skin every 28 days. (Patient not taking: Reported on 1/28/2022)    ondansetron (ZOFRAN ODT) 8 MG TbDL as directed (Patient not taking: Reported on 1/28/2022)    ondansetron (ZOFRAN) 8 MG tablet Take 1 tablet (8 mg total) by mouth every 8 (eight) hours as needed for Nausea. (Patient not taking: Reported on 1/28/2022)    triamcinolone acetonide 0.1% (KENALOG) 0.1 % cream Apply on the skin twice a day as needed (Patient not taking: Reported on 1/28/2022)    valACYclovir (VALTREX) 1000 MG tablet Take 2 tablets (2,000 mg total) by mouth 2 (two) times daily. for 1 day     No current facility-administered medications for this visit.       Review of patient's allergies indicates:  No Known Allergies    Family History   Problem Relation Age of Onset    Heart disease Mother     Hyperlipidemia Mother     Diabetes Mother     Heart disease Father     Hyperlipidemia Father     Diabetes Sister        Social History     Socioeconomic History    Marital status:    Tobacco Use    Smoking status: Never Smoker    Smokeless tobacco: Never Used   Substance and Sexual Activity    Alcohol use: Yes     Comment: occasionally    Drug use: Never    Sexual activity: Yes     Partners: Male       History of present illness:  Juana comes in today as a new patient with chief complaint of right thumb pain that has flared up on her on 3-4 occasions over the past several months.  She denies any specific injury or trauma to the hand.  She has treated this  with immobilization with a thumb spica splint and oral anti-inflammatories with fairly good results.  Unfortunately, she had a fairly acute flare up yesterday and was unable to control discomfort with anti-inflammatories and immobilization.  She is here today for further evaluation and treatment options.    Review of Systems:    Constitutional: Negative for chills, fever and weight loss.   HENT: Negative for congestion.    Eyes: Negative for discharge and redness.   Respiratory: Negative for cough and shortness of breath.    Cardiovascular: Negative for chest pain.   Gastrointestinal: Negative for nausea and vomiting.   Musculoskeletal: See HPI.   Skin: Negative for rash.   Neurological: Negative for headaches.   Endo/Heme/Allergies: Does not bruise/bleed easily.   Psychiatric/Behavioral: The patient is not nervous/anxious.    All other systems reviewed and are negative.       Objective:      Physical Examination:    Vital Signs:  There were no vitals filed for this visit.    Body mass index is 26.61 kg/m².    This a well-developed, well nourished patient in no acute distress.  They are alert and oriented and cooperative to examination.     Right hand exam:  Skin to right hand is clean dry and intact.  There is no erythema or ecchymosis.  There are no signs or symptoms of infection.  Patient is neurovascularly intact throughout her right upper extremity.  She can fully open and close her right hand into a fist.  She can oppose the right thumb to all digits in her right hand.  Radial pulse is 2 +.   strength would grade 5/5 and is equal bilaterally. She has a negative Finkelstein's.  She is nontender over anatomical snuffbox.  She does have a mildly positive basilar grind test.      Pertinent New Results:        XRAY Report / Interpretation:   Three views were taken of her right hand today:  AP, lateral and oblique views.  They reveal no acute fractures or dislocations.  Visualized soft tissues are unremarkable.   Patient does have early onset arthrosis in the basilar joint of her right thumb.          Assessment:       1. Arthritis of carpometacarpal (CMC) joint of right thumb      Plan:     Arthritis of carpometacarpal (CMC) joint of right thumb  -     X-Ray Hand 3 view Right  -     Small Joint Aspiration/Injection: R thumb CMC        Follow up in about 6 weeks (around 3/11/2022) for right thumb injection 01/28/22.    I injected her right thumb carpometacarpal joint today with Kenalog and lidocaine.  She tolerated this well.  We did discuss the pathophysiology of her diagnosis.  We will see how much relief she gets with this injection.  We will see her back in 6 weeks.  Course, she is welcome to continue with an NSAID of her choice and to immobilize for any acute flare ups if she would like.        Del Hinson, MPAS, PA-C    This note was created using [employer] voice recognition software that occasionally misinterprets words or phrases.

## 2022-01-28 NOTE — PROCEDURES
Small Joint Aspiration/Injection: R thumb CMC    Date/Time: 1/28/2022 8:30 AM  Performed by: Del Hinson PA-C  Authorized by: Del Hinson PA-C     Consent Done?:  Yes (Verbal)  Indications:  Pain  Site marked: the procedure site was marked    Timeout: prior to procedure the correct patient, procedure, and site was verified    Prep: patient was prepped and draped in usual sterile fashion      Local anesthesia used?: Yes    Local anesthetic:  Lidocaine 1% without epinephrine  Location:  Thumb  Site:  R thumb CMC  Ultrasonic guidance for needle placement?: No    Needle size:  25 G  Medications:  40 mg triamcinolone acetonide 40 mg/mL  Patient tolerance:  Patient tolerated the procedure well with no immediate complications

## 2022-02-04 ENCOUNTER — LAB VISIT (OUTPATIENT)
Dept: LAB | Facility: HOSPITAL | Age: 32
End: 2022-02-04
Attending: INTERNAL MEDICINE
Payer: COMMERCIAL

## 2022-02-04 DIAGNOSIS — R53.83 FATIGUE, UNSPECIFIED TYPE: ICD-10-CM

## 2022-02-04 DIAGNOSIS — E03.9 HYPOTHYROIDISM, UNSPECIFIED TYPE: Primary | ICD-10-CM

## 2022-02-04 DIAGNOSIS — E03.9 HYPOTHYROIDISM, UNSPECIFIED TYPE: ICD-10-CM

## 2022-02-04 LAB
ALBUMIN SERPL BCP-MCNC: 4.1 G/DL (ref 3.5–5.2)
ALP SERPL-CCNC: 52 U/L (ref 55–135)
ALT SERPL W/O P-5'-P-CCNC: 17 U/L (ref 10–44)
ANION GAP SERPL CALC-SCNC: 10 MMOL/L (ref 8–16)
AST SERPL-CCNC: 15 U/L (ref 10–40)
BASOPHILS # BLD AUTO: 0.04 K/UL (ref 0–0.2)
BASOPHILS NFR BLD: 0.5 % (ref 0–1.9)
BILIRUB SERPL-MCNC: 0.7 MG/DL (ref 0.1–1)
BUN SERPL-MCNC: 19 MG/DL (ref 6–20)
CALCIUM SERPL-MCNC: 9.5 MG/DL (ref 8.7–10.5)
CHLORIDE SERPL-SCNC: 105 MMOL/L (ref 95–110)
CO2 SERPL-SCNC: 23 MMOL/L (ref 23–29)
CREAT SERPL-MCNC: 0.8 MG/DL (ref 0.5–1.4)
DIFFERENTIAL METHOD: ABNORMAL
EOSINOPHIL # BLD AUTO: 0.1 K/UL (ref 0–0.5)
EOSINOPHIL NFR BLD: 0.6 % (ref 0–8)
ERYTHROCYTE [DISTWIDTH] IN BLOOD BY AUTOMATED COUNT: 12.6 % (ref 11.5–14.5)
EST. GFR  (AFRICAN AMERICAN): >60 ML/MIN/1.73 M^2
EST. GFR  (NON AFRICAN AMERICAN): >60 ML/MIN/1.73 M^2
GLUCOSE SERPL-MCNC: 81 MG/DL (ref 70–110)
HCT VFR BLD AUTO: 43.9 % (ref 37–48.5)
HGB BLD-MCNC: 14.3 G/DL (ref 12–16)
IMM GRANULOCYTES # BLD AUTO: 0.04 K/UL (ref 0–0.04)
IMM GRANULOCYTES NFR BLD AUTO: 0.5 % (ref 0–0.5)
LYMPHOCYTES # BLD AUTO: 1.3 K/UL (ref 1–4.8)
LYMPHOCYTES NFR BLD: 15.4 % (ref 18–48)
MCH RBC QN AUTO: 30.5 PG (ref 27–31)
MCHC RBC AUTO-ENTMCNC: 32.6 G/DL (ref 32–36)
MCV RBC AUTO: 94 FL (ref 82–98)
MONOCYTES # BLD AUTO: 0.4 K/UL (ref 0.3–1)
MONOCYTES NFR BLD: 4.9 % (ref 4–15)
NEUTROPHILS # BLD AUTO: 6.8 K/UL (ref 1.8–7.7)
NEUTROPHILS NFR BLD: 78.1 % (ref 38–73)
NRBC BLD-RTO: 0 /100 WBC
PLATELET # BLD AUTO: 341 K/UL (ref 150–450)
PMV BLD AUTO: 10.2 FL (ref 9.2–12.9)
POTASSIUM SERPL-SCNC: 4.2 MMOL/L (ref 3.5–5.1)
PROT SERPL-MCNC: 7.4 G/DL (ref 6–8.4)
RBC # BLD AUTO: 4.69 M/UL (ref 4–5.4)
SODIUM SERPL-SCNC: 138 MMOL/L (ref 136–145)
T4 FREE SERPL-MCNC: 0.94 NG/DL (ref 0.71–1.51)
TSH SERPL DL<=0.005 MIU/L-ACNC: 2.65 UIU/ML (ref 0.34–5.6)
WBC # BLD AUTO: 8.69 K/UL (ref 3.9–12.7)

## 2022-02-04 PROCEDURE — 84443 ASSAY THYROID STIM HORMONE: CPT | Performed by: INTERNAL MEDICINE

## 2022-02-04 PROCEDURE — 84439 ASSAY OF FREE THYROXINE: CPT | Performed by: INTERNAL MEDICINE

## 2022-02-04 PROCEDURE — 84480 ASSAY TRIIODOTHYRONINE (T3): CPT | Performed by: INTERNAL MEDICINE

## 2022-02-04 PROCEDURE — 36415 COLL VENOUS BLD VENIPUNCTURE: CPT | Performed by: INTERNAL MEDICINE

## 2022-02-04 PROCEDURE — 85025 COMPLETE CBC W/AUTO DIFF WBC: CPT | Performed by: INTERNAL MEDICINE

## 2022-02-04 PROCEDURE — 80053 COMPREHEN METABOLIC PANEL: CPT | Performed by: INTERNAL MEDICINE

## 2022-02-05 LAB — T3 SERPL-MCNC: 87 NG/DL (ref 71–180)

## 2022-02-23 RX ORDER — VALACYCLOVIR HYDROCHLORIDE 1 G/1
2000 TABLET, FILM COATED ORAL 2 TIMES DAILY
Qty: 4 TABLET | Refills: 6 | Status: SHIPPED | OUTPATIENT
Start: 2022-02-23 | End: 2022-02-28 | Stop reason: SDUPTHER

## 2022-02-28 RX ORDER — VALACYCLOVIR HYDROCHLORIDE 500 MG/1
500 TABLET, FILM COATED ORAL DAILY
Qty: 30 TABLET | Refills: 1 | Status: SHIPPED | OUTPATIENT
Start: 2022-02-28

## 2022-02-28 RX ORDER — ETONOGESTREL AND ETHINYL ESTRADIOL VAGINAL RING .015; .12 MG/D; MG/D
1 RING VAGINAL
Qty: 3 EACH | Refills: 3 | Status: SHIPPED | OUTPATIENT
Start: 2022-02-28 | End: 2022-09-26 | Stop reason: SDUPTHER

## 2022-03-11 DIAGNOSIS — R52 BODY ACHES: Primary | ICD-10-CM

## 2022-03-16 ENCOUNTER — TELEPHONE (OUTPATIENT)
Dept: OPHTHALMOLOGY | Facility: CLINIC | Age: 32
End: 2022-03-16
Payer: COMMERCIAL

## 2022-03-23 ENCOUNTER — OFFICE VISIT (OUTPATIENT)
Dept: OPHTHALMOLOGY | Facility: CLINIC | Age: 32
End: 2022-03-23
Payer: COMMERCIAL

## 2022-03-23 DIAGNOSIS — H43.823 VITREOMACULAR ADHESION OF BOTH EYES: Primary | ICD-10-CM

## 2022-03-23 DIAGNOSIS — H35.413 LATTICE DEGENERATION OF BOTH RETINAS: ICD-10-CM

## 2022-03-23 DIAGNOSIS — H33.323 RETINAL HOLE OF BOTH EYES: ICD-10-CM

## 2022-03-23 PROCEDURE — 92134 OCT, RETINA - OU - BOTH EYES: ICD-10-PCS | Mod: S$GLB,,, | Performed by: OPHTHALMOLOGY

## 2022-03-23 PROCEDURE — 1160F RVW MEDS BY RX/DR IN RCRD: CPT | Mod: CPTII,S$GLB,, | Performed by: OPHTHALMOLOGY

## 2022-03-23 PROCEDURE — 99204 PR OFFICE/OUTPT VISIT, NEW, LEVL IV, 45-59 MIN: ICD-10-PCS | Mod: S$GLB,,, | Performed by: OPHTHALMOLOGY

## 2022-03-23 PROCEDURE — 1159F PR MEDICATION LIST DOCUMENTED IN MEDICAL RECORD: ICD-10-PCS | Mod: CPTII,S$GLB,, | Performed by: OPHTHALMOLOGY

## 2022-03-23 PROCEDURE — 99999 PR PBB SHADOW E&M-EST. PATIENT-LVL III: CPT | Mod: PBBFAC,,, | Performed by: OPHTHALMOLOGY

## 2022-03-23 PROCEDURE — 92134 CPTRZ OPH DX IMG PST SGM RTA: CPT | Mod: S$GLB,,, | Performed by: OPHTHALMOLOGY

## 2022-03-23 PROCEDURE — 1160F PR REVIEW ALL MEDS BY PRESCRIBER/CLIN PHARMACIST DOCUMENTED: ICD-10-PCS | Mod: CPTII,S$GLB,, | Performed by: OPHTHALMOLOGY

## 2022-03-23 PROCEDURE — 99204 OFFICE O/P NEW MOD 45 MIN: CPT | Mod: S$GLB,,, | Performed by: OPHTHALMOLOGY

## 2022-03-23 PROCEDURE — 92201 OPSCPY EXTND RTA DRAW UNI/BI: CPT | Mod: S$GLB,,, | Performed by: OPHTHALMOLOGY

## 2022-03-23 PROCEDURE — 99999 PR PBB SHADOW E&M-EST. PATIENT-LVL III: ICD-10-PCS | Mod: PBBFAC,,, | Performed by: OPHTHALMOLOGY

## 2022-03-23 PROCEDURE — 1159F MED LIST DOCD IN RCRD: CPT | Mod: CPTII,S$GLB,, | Performed by: OPHTHALMOLOGY

## 2022-03-23 PROCEDURE — 92201 PR OPHTHALMOSCOPY, EXT, W/RET DRAW/SCLERAL DEPR, I&R, UNI/BI: ICD-10-PCS | Mod: S$GLB,,, | Performed by: OPHTHALMOLOGY

## 2022-03-23 NOTE — PROGRESS NOTES
HPI     30 YO female presents today for a retina eval referred by Dr. Benitez.   Patient states that she saw Dr. Benitez for a lasik eval and he noted she   had a retinal hole OS. Patient states that she was not aware and did not   have any symptoms prior to seeing Dr. Benitez. She does note that she sees   floaters occasionally when she is tired.     Last edited by Kary Lora, PCT on 3/23/2022  1:22 PM. (History)         A/P    ICD-10-CM ICD-9-CM   1. Vitreomacular adhesion of both eyes  H43.823 379.27   2. Lattice degeneration of both retinas  H35.413 362.63   3. Retinal hole of both eyes  H33.323 362.54       1. Vitreomacular adhesion of both eyes  2. Lattice degeneration of both retinas  3. Retinal hole of both eyes  Pt referred from Dr. Benitez for retina eval, having LASIK for myopic correction next Wed  Pt asymptomatic, no flashes/floaters    Exam notable for lattice OU with small retinal holes OU with tr amount of scarring adjacent OD>OS    Discussed at length with patient about options for retina holes in context of LASIK procedure. Discussed that LASIK may increase risk of retinal tear or detachment. Given that patient has lattice with few holes but is completely asymptomatic, we can observe for now versus perform laser barricade, though this does not guarantee that she may not develop a tear elsewhere in an un-lasered area in the retina that still has lattice without holes. After discussing risks/benefits, patient elected to observe for now and will follow up for periodic visits after her LASIK procedure    Pathology of PVD, Retinal Tear, Retinal Detachment reviewed in great detail  RD precautions discussed in detail, patient expressed understanding  RTC immediately PRN (especially ANY change flashes, floaters, vision, visual field)     RTC 2 weeks DFE/OCTm OU and scleral depressed exam repeat    I saw and examined the patient and reviewed in detail the findings documented. The final examination  findings, image interpretations, and plan as documented in the record represent my personal judgment and conclusions.    Shalom Pabon MD  Vitreoretinal Surgery   [facility]

## 2022-04-04 ENCOUNTER — PATIENT MESSAGE (OUTPATIENT)
Dept: OPHTHALMOLOGY | Facility: CLINIC | Age: 32
End: 2022-04-04
Payer: COMMERCIAL

## 2022-04-29 RX ORDER — FLUCONAZOLE 150 MG/1
150 TABLET ORAL
Qty: 2 TABLET | Refills: 3 | Status: SHIPPED | OUTPATIENT
Start: 2022-04-29 | End: 2022-04-30

## 2022-05-04 ENCOUNTER — PATIENT MESSAGE (OUTPATIENT)
Dept: OPHTHALMOLOGY | Facility: CLINIC | Age: 32
End: 2022-05-04
Payer: COMMERCIAL

## 2022-06-15 ENCOUNTER — OFFICE VISIT (OUTPATIENT)
Dept: OPHTHALMOLOGY | Facility: CLINIC | Age: 32
End: 2022-06-15
Payer: COMMERCIAL

## 2022-06-15 DIAGNOSIS — H33.323 RETINAL HOLE OF BOTH EYES: ICD-10-CM

## 2022-06-15 DIAGNOSIS — H35.413 LATTICE DEGENERATION OF BOTH RETINAS: ICD-10-CM

## 2022-06-15 DIAGNOSIS — H43.823 VITREOMACULAR ADHESION OF BOTH EYES: Primary | ICD-10-CM

## 2022-06-15 PROCEDURE — 92134 CPTRZ OPH DX IMG PST SGM RTA: CPT | Mod: S$GLB,,, | Performed by: OPHTHALMOLOGY

## 2022-06-15 PROCEDURE — 99999 PR PBB SHADOW E&M-EST. PATIENT-LVL III: CPT | Mod: PBBFAC,,, | Performed by: OPHTHALMOLOGY

## 2022-06-15 PROCEDURE — 1160F RVW MEDS BY RX/DR IN RCRD: CPT | Mod: CPTII,S$GLB,, | Performed by: OPHTHALMOLOGY

## 2022-06-15 PROCEDURE — 2023F DILAT RTA XM W/O RTNOPTHY: CPT | Mod: CPTII,S$GLB,, | Performed by: OPHTHALMOLOGY

## 2022-06-15 PROCEDURE — 2023F PR DILATED RETINAL EXAM W/O EVID OF RETINOPATHY: ICD-10-PCS | Mod: CPTII,S$GLB,, | Performed by: OPHTHALMOLOGY

## 2022-06-15 PROCEDURE — 99214 OFFICE O/P EST MOD 30 MIN: CPT | Mod: S$GLB,,, | Performed by: OPHTHALMOLOGY

## 2022-06-15 PROCEDURE — 1159F PR MEDICATION LIST DOCUMENTED IN MEDICAL RECORD: ICD-10-PCS | Mod: CPTII,S$GLB,, | Performed by: OPHTHALMOLOGY

## 2022-06-15 PROCEDURE — 1160F PR REVIEW ALL MEDS BY PRESCRIBER/CLIN PHARMACIST DOCUMENTED: ICD-10-PCS | Mod: CPTII,S$GLB,, | Performed by: OPHTHALMOLOGY

## 2022-06-15 PROCEDURE — 99999 PR PBB SHADOW E&M-EST. PATIENT-LVL III: ICD-10-PCS | Mod: PBBFAC,,, | Performed by: OPHTHALMOLOGY

## 2022-06-15 PROCEDURE — 92134 OCT, RETINA - OU - BOTH EYES: ICD-10-PCS | Mod: S$GLB,,, | Performed by: OPHTHALMOLOGY

## 2022-06-15 PROCEDURE — 1159F MED LIST DOCD IN RCRD: CPT | Mod: CPTII,S$GLB,, | Performed by: OPHTHALMOLOGY

## 2022-06-15 PROCEDURE — 99214 PR OFFICE/OUTPT VISIT, EST, LEVL IV, 30-39 MIN: ICD-10-PCS | Mod: S$GLB,,, | Performed by: OPHTHALMOLOGY

## 2022-06-15 NOTE — PROGRESS NOTES
HPI     DLS: 3/23/2022- pt here for VMA OU f/u states she had lasik surgery OU   since last visit. Not using any gtts currently. Denies pain/ FOL. Floaters   OU this past weekend. Short period of time when she seen them, have   settled since.     Last edited by Cori Mays on 6/15/2022  2:26 PM. (History)         A/P    ICD-10-CM ICD-9-CM   1. Vitreomacular adhesion of both eyes  H43.823 379.27   2. Lattice degeneration of both retinas  H35.413 362.63   3. Retinal hole of both eyes  H33.323 362.54       1. Vitreomacular adhesion of both eyes  2. Lattice degeneration of both retinas  3. Retinal hole of both eyes  Pt referred from Dr. Benitez for retina eval prior to LASIK for myopic correction    Pt asymptomatic, no flashes/floaters    6/15/2022   Exam notable for lattice OU with small retinal holes OU with tr amount of scarring adjacent OD>OS, stable since LASIK procedure     Plan: Observation     Pathology of PVD, Retinal Tear, Retinal Detachment reviewed in great detail  RD precautions discussed in detail, patient expressed understanding  RTC immediately PRN (especially ANY change flashes, floaters, vision, visual field)     RTC 12 mo DFE/OCTm OU     I saw and examined the patient and reviewed in detail the findings documented. The final examination findings, image interpretations, and plan as documented in the record represent my personal judgment and conclusions.    Shalom Pabon MD  Vitreoretinal Surgery   [facility]

## 2022-07-01 RX ORDER — SEMAGLUTIDE 1.34 MG/ML
0.25 INJECTION, SOLUTION SUBCUTANEOUS
Qty: 1 PEN | Refills: 0 | Status: SHIPPED | OUTPATIENT
Start: 2022-07-01 | End: 2022-07-21

## 2022-07-21 RX ORDER — SEMAGLUTIDE 1.34 MG/ML
1 INJECTION, SOLUTION SUBCUTANEOUS
Qty: 3 PEN | Refills: 3 | Status: SHIPPED | OUTPATIENT
Start: 2022-07-21 | End: 2023-07-21

## 2022-07-29 ENCOUNTER — DOCUMENTATION ONLY (OUTPATIENT)
Dept: FAMILY MEDICINE | Facility: CLINIC | Age: 32
End: 2022-07-29
Payer: COMMERCIAL

## 2022-07-29 DIAGNOSIS — J02.9 SORE THROAT: Primary | ICD-10-CM

## 2022-07-29 LAB
CTP QC/QA: YES
SARS-COV-2 AG RESP QL IA.RAPID: POSITIVE

## 2022-08-03 RX ORDER — BUTALBITAL, ACETAMINOPHEN AND CAFFEINE 50; 325; 40 MG/1; MG/1; MG/1
1 TABLET ORAL EVERY 4 HOURS PRN
Qty: 24 TABLET | Refills: 0 | Status: SHIPPED | OUTPATIENT
Start: 2022-08-03 | End: 2023-12-07 | Stop reason: SDUPTHER

## 2022-08-08 RX ORDER — FLUOXETINE HYDROCHLORIDE 20 MG/1
CAPSULE ORAL
Qty: 180 CAPSULE | Refills: 3 | Status: SHIPPED | OUTPATIENT
Start: 2022-08-08 | End: 2023-01-09 | Stop reason: SDUPTHER

## 2022-09-26 RX ORDER — ETONOGESTREL AND ETHINYL ESTRADIOL VAGINAL RING .015; .12 MG/D; MG/D
1 RING VAGINAL
Qty: 3 EACH | Refills: 3 | Status: SHIPPED | OUTPATIENT
Start: 2022-09-26 | End: 2022-12-15 | Stop reason: SDUPTHER

## 2022-10-19 RX ORDER — BUSPIRONE HYDROCHLORIDE 7.5 MG/1
7.5 TABLET ORAL 3 TIMES DAILY PRN
Qty: 270 TABLET | Refills: 3 | Status: SHIPPED | OUTPATIENT
Start: 2022-10-19 | End: 2023-12-27 | Stop reason: SDUPTHER

## 2022-10-28 ENCOUNTER — LAB VISIT (OUTPATIENT)
Dept: LAB | Facility: HOSPITAL | Age: 32
End: 2022-10-28
Attending: NURSE PRACTITIONER
Payer: COMMERCIAL

## 2022-10-28 DIAGNOSIS — F32.A DEPRESSION, UNSPECIFIED DEPRESSION TYPE: ICD-10-CM

## 2022-10-28 DIAGNOSIS — E03.9 HYPOTHYROIDISM, UNSPECIFIED TYPE: ICD-10-CM

## 2022-10-28 DIAGNOSIS — R11.0 NAUSEA: Primary | ICD-10-CM

## 2022-10-28 DIAGNOSIS — R11.0 NAUSEA: ICD-10-CM

## 2022-10-28 LAB
25(OH)D3+25(OH)D2 SERPL-MCNC: 43 NG/ML (ref 30–96)
ALBUMIN SERPL BCP-MCNC: 4 G/DL (ref 3.5–5.2)
ALP SERPL-CCNC: 66 U/L (ref 55–135)
ALT SERPL W/O P-5'-P-CCNC: 20 U/L (ref 10–44)
ANION GAP SERPL CALC-SCNC: 7 MMOL/L (ref 8–16)
AST SERPL-CCNC: 20 U/L (ref 10–40)
BILIRUB SERPL-MCNC: 0.6 MG/DL (ref 0.1–1)
BUN SERPL-MCNC: 12 MG/DL (ref 6–20)
CALCIUM SERPL-MCNC: 9.3 MG/DL (ref 8.7–10.5)
CHLORIDE SERPL-SCNC: 104 MMOL/L (ref 95–110)
CO2 SERPL-SCNC: 27 MMOL/L (ref 23–29)
CREAT SERPL-MCNC: 0.8 MG/DL (ref 0.5–1.4)
ERYTHROCYTE [DISTWIDTH] IN BLOOD BY AUTOMATED COUNT: 12.1 % (ref 11.5–14.5)
EST. GFR  (NO RACE VARIABLE): >60 ML/MIN/1.73 M^2
GLUCOSE SERPL-MCNC: 62 MG/DL (ref 70–110)
HCT VFR BLD AUTO: 42.4 % (ref 37–48.5)
HGB BLD-MCNC: 14.3 G/DL (ref 12–16)
MAGNESIUM SERPL-MCNC: 2 MG/DL (ref 1.6–2.6)
MCH RBC QN AUTO: 30.4 PG (ref 27–31)
MCHC RBC AUTO-ENTMCNC: 33.7 G/DL (ref 32–36)
MCV RBC AUTO: 90 FL (ref 82–98)
PLATELET # BLD AUTO: 254 K/UL (ref 150–450)
PMV BLD AUTO: 10.5 FL (ref 9.2–12.9)
POTASSIUM SERPL-SCNC: 4 MMOL/L (ref 3.5–5.1)
PROT SERPL-MCNC: 7.1 G/DL (ref 6–8.4)
RBC # BLD AUTO: 4.7 M/UL (ref 4–5.4)
SODIUM SERPL-SCNC: 138 MMOL/L (ref 136–145)
T4 FREE SERPL-MCNC: 1.26 NG/DL (ref 0.71–1.51)
TSH SERPL DL<=0.005 MIU/L-ACNC: 0.05 UIU/ML (ref 0.34–5.6)
WBC # BLD AUTO: 6.64 K/UL (ref 3.9–12.7)

## 2022-10-28 PROCEDURE — 84439 ASSAY OF FREE THYROXINE: CPT | Performed by: NURSE PRACTITIONER

## 2022-10-28 PROCEDURE — 82306 VITAMIN D 25 HYDROXY: CPT | Performed by: NURSE PRACTITIONER

## 2022-10-28 PROCEDURE — 83735 ASSAY OF MAGNESIUM: CPT | Performed by: NURSE PRACTITIONER

## 2022-10-28 PROCEDURE — 84443 ASSAY THYROID STIM HORMONE: CPT | Performed by: NURSE PRACTITIONER

## 2022-10-28 PROCEDURE — 80053 COMPREHEN METABOLIC PANEL: CPT | Performed by: NURSE PRACTITIONER

## 2022-10-28 PROCEDURE — 85027 COMPLETE CBC AUTOMATED: CPT | Performed by: NURSE PRACTITIONER

## 2022-10-28 PROCEDURE — 36415 COLL VENOUS BLD VENIPUNCTURE: CPT | Performed by: NURSE PRACTITIONER

## 2022-11-02 RX ORDER — LEVOTHYROXINE SODIUM 150 UG/1
TABLET ORAL
Qty: 90 TABLET | Refills: 3 | Status: SHIPPED | OUTPATIENT
Start: 2022-11-02 | End: 2023-01-09 | Stop reason: SDUPTHER

## 2022-11-05 RX ORDER — OSELTAMIVIR PHOSPHATE 75 MG/1
75 CAPSULE ORAL DAILY
Qty: 10 CAPSULE | Refills: 0 | Status: SHIPPED | OUTPATIENT
Start: 2022-11-05 | End: 2022-11-15

## 2022-12-15 RX ORDER — ETONOGESTREL AND ETHINYL ESTRADIOL VAGINAL RING .015; .12 MG/D; MG/D
1 RING VAGINAL
Qty: 3 EACH | Refills: 3 | Status: SHIPPED | OUTPATIENT
Start: 2022-12-15 | End: 2023-03-09 | Stop reason: SDUPTHER

## 2023-01-09 RX ORDER — LEVOTHYROXINE SODIUM 150 UG/1
TABLET ORAL
Qty: 90 TABLET | Refills: 3 | Status: SHIPPED | OUTPATIENT
Start: 2023-01-09 | End: 2023-04-27 | Stop reason: SDUPTHER

## 2023-01-09 RX ORDER — FLUOXETINE HYDROCHLORIDE 20 MG/1
CAPSULE ORAL
Qty: 180 CAPSULE | Refills: 3 | Status: SHIPPED | OUTPATIENT
Start: 2023-01-09 | End: 2023-08-10 | Stop reason: SDUPTHER

## 2023-01-13 RX ORDER — AZITHROMYCIN 1 G/1
1 POWDER, FOR SUSPENSION ORAL ONCE
Qty: 1 PACKET | Refills: 0 | Status: SHIPPED | OUTPATIENT
Start: 2023-01-13 | End: 2023-01-13

## 2023-01-13 RX ORDER — AZITHROMYCIN 1 G/1
1 POWDER, FOR SUSPENSION ORAL ONCE
Qty: 1 PACKET | Refills: 0 | Status: SHIPPED | OUTPATIENT
Start: 2023-01-13 | End: 2023-01-13 | Stop reason: SDUPTHER

## 2023-01-14 RX ORDER — METHYLPREDNISOLONE 4 MG/1
TABLET ORAL
Qty: 21 EACH | Refills: 0 | Status: SHIPPED | OUTPATIENT
Start: 2023-01-14 | End: 2023-02-04

## 2023-01-14 RX ORDER — AZITHROMYCIN 250 MG/1
TABLET, FILM COATED ORAL
Qty: 6 TABLET | Refills: 0 | Status: SHIPPED | OUTPATIENT
Start: 2023-01-14 | End: 2023-01-19

## 2023-03-09 RX ORDER — ETONOGESTREL AND ETHINYL ESTRADIOL VAGINAL RING .015; .12 MG/D; MG/D
1 RING VAGINAL
Qty: 3 EACH | Refills: 3 | Status: SHIPPED | OUTPATIENT
Start: 2023-03-09 | End: 2023-09-28

## 2023-03-30 ENCOUNTER — LAB VISIT (OUTPATIENT)
Dept: LAB | Facility: HOSPITAL | Age: 33
End: 2023-03-30
Attending: INTERNAL MEDICINE
Payer: COMMERCIAL

## 2023-03-30 DIAGNOSIS — R22.1 NECK SWELLING: ICD-10-CM

## 2023-03-30 DIAGNOSIS — R50.9 FEVER, UNSPECIFIED FEVER CAUSE: ICD-10-CM

## 2023-03-30 DIAGNOSIS — R22.1 NECK SWELLING: Primary | ICD-10-CM

## 2023-03-30 DIAGNOSIS — R59.1 LYMPHADENOPATHY: ICD-10-CM

## 2023-03-30 LAB
ALBUMIN SERPL BCP-MCNC: 4 G/DL (ref 3.5–5.2)
ALP SERPL-CCNC: 58 U/L (ref 55–135)
ALT SERPL W/O P-5'-P-CCNC: 23 U/L (ref 10–44)
ANION GAP SERPL CALC-SCNC: 8 MMOL/L (ref 8–16)
AST SERPL-CCNC: 28 U/L (ref 10–40)
BASOPHILS # BLD AUTO: 0.06 K/UL (ref 0–0.2)
BASOPHILS NFR BLD: 0.9 % (ref 0–1.9)
BILIRUB SERPL-MCNC: 0.6 MG/DL (ref 0.1–1)
BUN SERPL-MCNC: 12 MG/DL (ref 6–20)
CALCIUM SERPL-MCNC: 9.2 MG/DL (ref 8.7–10.5)
CHLORIDE SERPL-SCNC: 107 MMOL/L (ref 95–110)
CO2 SERPL-SCNC: 25 MMOL/L (ref 23–29)
CREAT SERPL-MCNC: 0.9 MG/DL (ref 0.5–1.4)
CRP SERPL-MCNC: 0.53 MG/DL
DIFFERENTIAL METHOD: NORMAL
EOSINOPHIL # BLD AUTO: 0.3 K/UL (ref 0–0.5)
EOSINOPHIL NFR BLD: 3.8 % (ref 0–8)
ERYTHROCYTE [DISTWIDTH] IN BLOOD BY AUTOMATED COUNT: 11.9 % (ref 11.5–14.5)
ERYTHROCYTE [SEDIMENTATION RATE] IN BLOOD BY WESTERGREN METHOD: 2 MM/HR (ref 0–20)
EST. GFR  (NO RACE VARIABLE): >60 ML/MIN/1.73 M^2
GLUCOSE SERPL-MCNC: 92 MG/DL (ref 70–110)
HCT VFR BLD AUTO: 41.9 % (ref 37–48.5)
HETEROPH AB SERPL QL IA: NEGATIVE
HGB BLD-MCNC: 13.7 G/DL (ref 12–16)
IMM GRANULOCYTES # BLD AUTO: 0.03 K/UL (ref 0–0.04)
IMM GRANULOCYTES NFR BLD AUTO: 0.5 % (ref 0–0.5)
LYMPHOCYTES # BLD AUTO: 1.7 K/UL (ref 1–4.8)
LYMPHOCYTES NFR BLD: 25.2 % (ref 18–48)
MCH RBC QN AUTO: 31 PG (ref 27–31)
MCHC RBC AUTO-ENTMCNC: 32.7 G/DL (ref 32–36)
MCV RBC AUTO: 95 FL (ref 82–98)
MONOCYTES # BLD AUTO: 0.4 K/UL (ref 0.3–1)
MONOCYTES NFR BLD: 6.3 % (ref 4–15)
NEUTROPHILS # BLD AUTO: 4.2 K/UL (ref 1.8–7.7)
NEUTROPHILS NFR BLD: 63.3 % (ref 38–73)
NRBC BLD-RTO: 0 /100 WBC
PLATELET # BLD AUTO: 236 K/UL (ref 150–450)
PMV BLD AUTO: 10.5 FL (ref 9.2–12.9)
POTASSIUM SERPL-SCNC: 3.9 MMOL/L (ref 3.5–5.1)
PROT SERPL-MCNC: 7.3 G/DL (ref 6–8.4)
RBC # BLD AUTO: 4.42 M/UL (ref 4–5.4)
SODIUM SERPL-SCNC: 140 MMOL/L (ref 136–145)
WBC # BLD AUTO: 6.66 K/UL (ref 3.9–12.7)

## 2023-03-30 PROCEDURE — 85651 RBC SED RATE NONAUTOMATED: CPT | Performed by: INTERNAL MEDICINE

## 2023-03-30 PROCEDURE — 86140 C-REACTIVE PROTEIN: CPT | Performed by: INTERNAL MEDICINE

## 2023-03-30 PROCEDURE — 85025 COMPLETE CBC W/AUTO DIFF WBC: CPT | Performed by: INTERNAL MEDICINE

## 2023-03-30 PROCEDURE — 80053 COMPREHEN METABOLIC PANEL: CPT | Performed by: INTERNAL MEDICINE

## 2023-03-30 PROCEDURE — 86308 HETEROPHILE ANTIBODY SCREEN: CPT | Performed by: INTERNAL MEDICINE

## 2023-03-30 PROCEDURE — 86038 ANTINUCLEAR ANTIBODIES: CPT | Performed by: INTERNAL MEDICINE

## 2023-03-31 LAB — ANA TITR SER IF: NEGATIVE {TITER}

## 2023-04-02 RX ORDER — CEFDINIR 300 MG/1
300 CAPSULE ORAL 2 TIMES DAILY
Qty: 20 CAPSULE | Refills: 0 | Status: SHIPPED | OUTPATIENT
Start: 2023-04-02 | End: 2023-04-12

## 2023-04-04 DIAGNOSIS — R59.0 CERVICAL LYMPHADENOPATHY: Primary | ICD-10-CM

## 2023-04-06 ENCOUNTER — HOSPITAL ENCOUNTER (OUTPATIENT)
Dept: RADIOLOGY | Facility: HOSPITAL | Age: 33
Discharge: HOME | End: 2023-04-06
Payer: COMMERCIAL

## 2023-04-06 DIAGNOSIS — R59.0 CERVICAL LYMPHADENOPATHY: ICD-10-CM

## 2023-04-06 PROCEDURE — 76536 US EXAM OF HEAD AND NECK: CPT | Mod: TC

## 2023-04-27 RX ORDER — LEVOTHYROXINE SODIUM 150 UG/1
TABLET ORAL
Qty: 90 TABLET | Refills: 3 | Status: SHIPPED | OUTPATIENT
Start: 2023-04-27

## 2023-04-28 RX ORDER — AZITHROMYCIN 250 MG/1
TABLET, FILM COATED ORAL
Qty: 6 TABLET | Refills: 0 | Status: SHIPPED | OUTPATIENT
Start: 2023-04-28 | End: 2023-04-28

## 2023-04-28 RX ORDER — METHYLPREDNISOLONE 4 MG/1
TABLET ORAL
Qty: 21 EACH | Refills: 0 | Status: SHIPPED | OUTPATIENT
Start: 2023-04-28 | End: 2023-04-28

## 2023-04-28 RX ORDER — AZITHROMYCIN 250 MG/1
TABLET, FILM COATED ORAL
Qty: 6 TABLET | Refills: 0 | Status: SHIPPED | OUTPATIENT
Start: 2023-04-28 | End: 2023-05-03

## 2023-04-28 RX ORDER — METHYLPREDNISOLONE 4 MG/1
TABLET ORAL
Qty: 21 EACH | Refills: 0 | Status: SHIPPED | OUTPATIENT
Start: 2023-04-28 | End: 2023-05-19

## 2023-05-18 LAB — PAP RECOMMENDATION EXT: NORMAL

## 2023-06-05 DIAGNOSIS — M54.50 ACUTE MIDLINE LOW BACK PAIN WITHOUT SCIATICA: Primary | ICD-10-CM

## 2023-06-05 RX ORDER — FLUCONAZOLE 150 MG/1
150 TABLET ORAL EVERY OTHER DAY
Qty: 2 TABLET | Refills: 1 | Status: SHIPPED | OUTPATIENT
Start: 2023-06-05

## 2023-06-07 RX ORDER — TRIAMCINOLONE ACETONIDE 0.25 MG/G
1 CREAM TOPICAL 2 TIMES DAILY
Qty: 1 EACH | Refills: 1 | Status: SHIPPED | OUTPATIENT
Start: 2023-06-07

## 2023-06-21 RX ORDER — AZITHROMYCIN 250 MG/1
TABLET, FILM COATED ORAL
Qty: 6 TABLET | Refills: 0 | Status: SHIPPED | OUTPATIENT
Start: 2023-06-21 | End: 2023-06-26

## 2023-08-03 DIAGNOSIS — E78.5 DYSLIPIDEMIA: Primary | ICD-10-CM

## 2023-08-03 DIAGNOSIS — E03.9 HYPOTHYROIDISM, UNSPECIFIED TYPE: ICD-10-CM

## 2023-08-10 RX ORDER — FLUOXETINE HYDROCHLORIDE 20 MG/1
CAPSULE ORAL
Qty: 180 CAPSULE | Refills: 3 | Status: SHIPPED | OUTPATIENT
Start: 2023-08-10 | End: 2024-01-17 | Stop reason: SDUPTHER

## 2023-08-10 RX ORDER — METHYLPREDNISOLONE 4 MG/1
TABLET ORAL
Qty: 21 EACH | Refills: 0 | Status: SHIPPED | OUTPATIENT
Start: 2023-08-10 | End: 2023-08-31

## 2023-08-10 RX ORDER — AZITHROMYCIN 250 MG/1
TABLET, FILM COATED ORAL
Qty: 6 TABLET | Refills: 0 | Status: SHIPPED | OUTPATIENT
Start: 2023-08-10 | End: 2023-08-15

## 2023-08-17 ENCOUNTER — LAB VISIT (OUTPATIENT)
Dept: LAB | Facility: HOSPITAL | Age: 33
End: 2023-08-17
Attending: INTERNAL MEDICINE
Payer: COMMERCIAL

## 2023-08-17 DIAGNOSIS — E03.9 HYPOTHYROIDISM, UNSPECIFIED TYPE: ICD-10-CM

## 2023-08-17 DIAGNOSIS — E78.5 DYSLIPIDEMIA: ICD-10-CM

## 2023-08-17 DIAGNOSIS — E03.9 HYPOTHYROIDISM, UNSPECIFIED TYPE: Primary | ICD-10-CM

## 2023-08-17 LAB
ALBUMIN SERPL BCP-MCNC: 4.2 G/DL (ref 3.5–5.2)
ALP SERPL-CCNC: 77 U/L (ref 55–135)
ALT SERPL W/O P-5'-P-CCNC: 20 U/L (ref 10–44)
ANION GAP SERPL CALC-SCNC: 8 MMOL/L (ref 8–16)
AST SERPL-CCNC: 31 U/L (ref 10–40)
BILIRUB SERPL-MCNC: 0.9 MG/DL (ref 0.1–1)
BUN SERPL-MCNC: 11 MG/DL (ref 6–20)
CALCIUM SERPL-MCNC: 8.8 MG/DL (ref 8.7–10.5)
CHLORIDE SERPL-SCNC: 105 MMOL/L (ref 95–110)
CHOLEST SERPL-MCNC: 207 MG/DL (ref 120–199)
CHOLEST/HDLC SERPL: 3.2 {RATIO} (ref 2–5)
CO2 SERPL-SCNC: 25 MMOL/L (ref 23–29)
CREAT SERPL-MCNC: 1 MG/DL (ref 0.5–1.4)
EST. GFR  (NO RACE VARIABLE): >60 ML/MIN/1.73 M^2
GLUCOSE SERPL-MCNC: 77 MG/DL (ref 70–110)
HDLC SERPL-MCNC: 64 MG/DL (ref 40–75)
HDLC SERPL: 30.9 % (ref 20–50)
LDLC SERPL CALC-MCNC: 131.8 MG/DL (ref 63–159)
NONHDLC SERPL-MCNC: 143 MG/DL
POTASSIUM SERPL-SCNC: 3.7 MMOL/L (ref 3.5–5.1)
PROT SERPL-MCNC: 7.2 G/DL (ref 6–8.4)
SODIUM SERPL-SCNC: 138 MMOL/L (ref 136–145)
T4 FREE SERPL-MCNC: 1.22 NG/DL (ref 0.71–1.51)
TRIGL SERPL-MCNC: 56 MG/DL (ref 30–150)
TSH SERPL DL<=0.005 MIU/L-ACNC: 0.17 UIU/ML (ref 0.34–5.6)

## 2023-08-17 PROCEDURE — 80061 LIPID PANEL: CPT | Performed by: INTERNAL MEDICINE

## 2023-08-17 PROCEDURE — 84439 ASSAY OF FREE THYROXINE: CPT | Performed by: NURSE PRACTITIONER

## 2023-08-17 PROCEDURE — 36415 COLL VENOUS BLD VENIPUNCTURE: CPT | Performed by: INTERNAL MEDICINE

## 2023-08-17 PROCEDURE — 84443 ASSAY THYROID STIM HORMONE: CPT | Performed by: NURSE PRACTITIONER

## 2023-08-17 PROCEDURE — 80053 COMPREHEN METABOLIC PANEL: CPT | Performed by: INTERNAL MEDICINE

## 2023-09-05 RX ORDER — METHYLPREDNISOLONE 4 MG/1
TABLET ORAL
Qty: 21 EACH | Refills: 0 | Status: SHIPPED | OUTPATIENT
Start: 2023-09-05 | End: 2023-09-26

## 2023-09-05 RX ORDER — AZITHROMYCIN 250 MG/1
TABLET, FILM COATED ORAL
Qty: 6 TABLET | Refills: 0 | Status: SHIPPED | OUTPATIENT
Start: 2023-09-05 | End: 2023-09-10

## 2023-09-28 RX ORDER — SPIRONOLACTONE 25 MG/1
25 TABLET ORAL DAILY
Qty: 90 TABLET | Refills: 3 | Status: SHIPPED | OUTPATIENT
Start: 2023-09-28

## 2023-10-11 ENCOUNTER — PATIENT MESSAGE (OUTPATIENT)
Dept: FAMILY MEDICINE | Facility: CLINIC | Age: 33
End: 2023-10-11

## 2023-11-08 RX ORDER — DOXYCYCLINE 100 MG/1
100 CAPSULE ORAL 2 TIMES DAILY
Qty: 10 CAPSULE | Refills: 0 | Status: SHIPPED | OUTPATIENT
Start: 2023-11-08 | End: 2024-02-19 | Stop reason: SDUPTHER

## 2023-12-01 ENCOUNTER — TELEPHONE (OUTPATIENT)
Dept: CARDIOLOGY | Facility: CLINIC | Age: 33
End: 2023-12-01
Payer: COMMERCIAL

## 2023-12-01 DIAGNOSIS — E66.9 OBESITY, UNSPECIFIED CLASSIFICATION, UNSPECIFIED OBESITY TYPE, UNSPECIFIED WHETHER SERIOUS COMORBIDITY PRESENT: Primary | ICD-10-CM

## 2023-12-08 RX ORDER — BUTALBITAL, ACETAMINOPHEN AND CAFFEINE 50; 325; 40 MG/1; MG/1; MG/1
1 TABLET ORAL EVERY 4 HOURS PRN
Qty: 24 TABLET | Refills: 0 | Status: SHIPPED | OUTPATIENT
Start: 2023-12-08

## 2023-12-27 RX ORDER — BUSPIRONE HYDROCHLORIDE 7.5 MG/1
7.5 TABLET ORAL 3 TIMES DAILY PRN
Qty: 270 TABLET | Refills: 3 | Status: SHIPPED | OUTPATIENT
Start: 2023-12-27 | End: 2023-12-28 | Stop reason: SDUPTHER

## 2023-12-28 RX ORDER — BUSPIRONE HYDROCHLORIDE 7.5 MG/1
7.5 TABLET ORAL 3 TIMES DAILY
Qty: 90 TABLET | Refills: 11 | Status: SHIPPED | OUTPATIENT
Start: 2023-12-28 | End: 2024-02-08

## 2023-12-29 DIAGNOSIS — R59.0 LOCALIZED ENLARGED LYMPH NODES: Primary | ICD-10-CM

## 2024-01-02 RX ORDER — OSELTAMIVIR PHOSPHATE 75 MG/1
75 CAPSULE ORAL DAILY
Qty: 5 CAPSULE | Refills: 0 | Status: SHIPPED | OUTPATIENT
Start: 2024-01-02 | End: 2024-01-07

## 2024-01-03 RX ORDER — ONDANSETRON 8 MG/1
8 TABLET, ORALLY DISINTEGRATING ORAL EVERY 12 HOURS PRN
Qty: 20 TABLET | Refills: 1 | Status: SHIPPED | OUTPATIENT
Start: 2024-01-03

## 2024-01-05 ENCOUNTER — HOSPITAL ENCOUNTER (OUTPATIENT)
Dept: RADIOLOGY | Facility: HOSPITAL | Age: 34
Discharge: HOME | End: 2024-01-05
Attending: NURSE PRACTITIONER
Payer: COMMERCIAL

## 2024-01-05 DIAGNOSIS — R59.0 LOCALIZED ENLARGED LYMPH NODES: ICD-10-CM

## 2024-01-05 PROCEDURE — 70491 CT SOFT TISSUE NECK W/DYE: CPT | Mod: TC

## 2024-01-05 PROCEDURE — 25500020 PHARM REV CODE 255: Performed by: NURSE PRACTITIONER

## 2024-01-05 RX ADMIN — IOHEXOL 100 ML: 350 INJECTION, SOLUTION INTRAVENOUS at 12:01

## 2024-01-17 RX ORDER — FLUOXETINE HYDROCHLORIDE 20 MG/1
CAPSULE ORAL
Qty: 180 CAPSULE | Refills: 3 | Status: SHIPPED | OUTPATIENT
Start: 2024-01-17

## 2024-02-08 RX ORDER — BUSPIRONE HYDROCHLORIDE 10 MG/1
10 TABLET ORAL 3 TIMES DAILY
Qty: 90 TABLET | Refills: 11 | Status: SHIPPED | OUTPATIENT
Start: 2024-02-08 | End: 2025-02-07

## 2024-02-19 RX ORDER — DOXYCYCLINE 100 MG/1
100 CAPSULE ORAL 2 TIMES DAILY
Qty: 10 CAPSULE | Refills: 0 | Status: ON HOLD | OUTPATIENT
Start: 2024-02-19 | End: 2024-05-17 | Stop reason: CLARIF

## 2024-03-21 RX ORDER — DEXAMETHASONE SODIUM PHOSPHATE 4 MG/ML
4 INJECTION, SOLUTION INTRA-ARTICULAR; INTRALESIONAL; INTRAMUSCULAR; INTRAVENOUS; SOFT TISSUE ONCE
Qty: 1 ML | Refills: 0 | Status: SHIPPED | OUTPATIENT
Start: 2024-03-21 | End: 2024-03-23

## 2024-03-27 DIAGNOSIS — H02.403 ACQUIRED INVOLUTIONAL PTOSIS OF EYELID, BILATERAL: Primary | ICD-10-CM

## 2024-04-12 DIAGNOSIS — L70.9 ACNE, UNSPECIFIED ACNE TYPE: Primary | ICD-10-CM

## 2024-04-12 RX ORDER — CLINDAMYCIN PHOSPHATE/BENZOYL PEROXIDE/ADAPALENE 1.5; 31; 12 MG/G; MG/G; MG/G
1 GEL TOPICAL 2 TIMES DAILY
Qty: 30 G | Refills: 1 | Status: ON HOLD | OUTPATIENT
Start: 2024-04-12 | End: 2024-05-17 | Stop reason: CLARIF

## 2024-04-12 RX ORDER — ADAPALENE 0.1 G/100G
CREAM TOPICAL NIGHTLY
Qty: 45 G | Refills: 0 | Status: ON HOLD | OUTPATIENT
Start: 2024-04-12 | End: 2024-05-17 | Stop reason: CLARIF

## 2024-04-12 RX ORDER — TRETINOIN 0.5 MG/G
CREAM TOPICAL NIGHTLY
Qty: 45 G | Refills: 11 | Status: SHIPPED | OUTPATIENT
Start: 2024-04-12 | End: 2024-06-06

## 2024-04-12 RX ORDER — ADAPALENE AND BENZOYL PEROXIDE 3; 25 MG/G; MG/G
1 GEL TOPICAL DAILY
Qty: 45 G | Refills: 1 | Status: ON HOLD | OUTPATIENT
Start: 2024-04-12 | End: 2024-05-17 | Stop reason: CLARIF

## 2024-04-12 RX ORDER — CLINDAMYCIN PHOSPHATE/BENZOYL PEROXIDE/ADAPALENE 1.5; 31; 12 MG/G; MG/G; MG/G
0.15 GEL TOPICAL 2 TIMES DAILY
COMMUNITY
End: 2024-04-12 | Stop reason: SDUPTHER

## 2024-04-18 DIAGNOSIS — E03.9 HYPOTHYROIDISM, UNSPECIFIED TYPE: Primary | ICD-10-CM

## 2024-04-23 ENCOUNTER — LAB VISIT (OUTPATIENT)
Dept: LAB | Facility: HOSPITAL | Age: 34
End: 2024-04-23
Payer: COMMERCIAL

## 2024-04-23 DIAGNOSIS — E03.9 HYPOTHYROIDISM, UNSPECIFIED TYPE: ICD-10-CM

## 2024-04-23 LAB
ALBUMIN SERPL BCP-MCNC: 4.4 G/DL (ref 3.5–5.2)
ALP SERPL-CCNC: 70 U/L (ref 55–135)
ALT SERPL W/O P-5'-P-CCNC: 24 U/L (ref 10–44)
ANION GAP SERPL CALC-SCNC: 8 MMOL/L (ref 8–16)
AST SERPL-CCNC: 23 U/L (ref 10–40)
BASOPHILS # BLD AUTO: 0.08 K/UL (ref 0–0.2)
BASOPHILS NFR BLD: 0.9 % (ref 0–1.9)
BILIRUB SERPL-MCNC: 0.4 MG/DL (ref 0.1–1)
BUN SERPL-MCNC: 21 MG/DL (ref 6–20)
CALCIUM SERPL-MCNC: 9.1 MG/DL (ref 8.7–10.5)
CHLORIDE SERPL-SCNC: 104 MMOL/L (ref 95–110)
CO2 SERPL-SCNC: 28 MMOL/L (ref 23–29)
CREAT SERPL-MCNC: 0.7 MG/DL (ref 0.5–1.4)
DIFFERENTIAL METHOD BLD: ABNORMAL
EOSINOPHIL # BLD AUTO: 0.4 K/UL (ref 0–0.5)
EOSINOPHIL NFR BLD: 4.3 % (ref 0–8)
ERYTHROCYTE [DISTWIDTH] IN BLOOD BY AUTOMATED COUNT: 11.9 % (ref 11.5–14.5)
EST. GFR  (NO RACE VARIABLE): >60 ML/MIN/1.73 M^2
GLUCOSE SERPL-MCNC: 76 MG/DL (ref 70–110)
HCT VFR BLD AUTO: 40.8 % (ref 37–48.5)
HGB BLD-MCNC: 13.3 G/DL (ref 12–16)
IMM GRANULOCYTES # BLD AUTO: 0.03 K/UL (ref 0–0.04)
IMM GRANULOCYTES NFR BLD AUTO: 0.3 % (ref 0–0.5)
LYMPHOCYTES # BLD AUTO: 2.2 K/UL (ref 1–4.8)
LYMPHOCYTES NFR BLD: 24.5 % (ref 18–48)
MCH RBC QN AUTO: 31.1 PG (ref 27–31)
MCHC RBC AUTO-ENTMCNC: 32.6 G/DL (ref 32–36)
MCV RBC AUTO: 95 FL (ref 82–98)
MONOCYTES # BLD AUTO: 0.5 K/UL (ref 0.3–1)
MONOCYTES NFR BLD: 6 % (ref 4–15)
NEUTROPHILS # BLD AUTO: 5.7 K/UL (ref 1.8–7.7)
NEUTROPHILS NFR BLD: 64 % (ref 38–73)
NRBC BLD-RTO: 0 /100 WBC
PLATELET # BLD AUTO: 262 K/UL (ref 150–450)
PMV BLD AUTO: 10.1 FL (ref 9.2–12.9)
POTASSIUM SERPL-SCNC: 3.7 MMOL/L (ref 3.5–5.1)
PROT SERPL-MCNC: 7.1 G/DL (ref 6–8.4)
RBC # BLD AUTO: 4.28 M/UL (ref 4–5.4)
SODIUM SERPL-SCNC: 140 MMOL/L (ref 136–145)
TSH SERPL DL<=0.005 MIU/L-ACNC: 4.29 UIU/ML (ref 0.34–5.6)
WBC # BLD AUTO: 8.97 K/UL (ref 3.9–12.7)

## 2024-04-23 PROCEDURE — 85025 COMPLETE CBC W/AUTO DIFF WBC: CPT

## 2024-04-23 PROCEDURE — 80053 COMPREHEN METABOLIC PANEL: CPT

## 2024-04-23 PROCEDURE — 84443 ASSAY THYROID STIM HORMONE: CPT

## 2024-04-23 PROCEDURE — 36415 COLL VENOUS BLD VENIPUNCTURE: CPT

## 2024-05-03 RX ORDER — KETOROLAC TROMETHAMINE 10 MG/1
10 TABLET, FILM COATED ORAL EVERY 6 HOURS
Qty: 20 TABLET | Refills: 0 | Status: SHIPPED | OUTPATIENT
Start: 2024-05-03 | End: 2024-05-08

## 2024-05-03 RX ORDER — KETOROLAC TROMETHAMINE 30 MG/ML
30 INJECTION, SOLUTION INTRAMUSCULAR; INTRAVENOUS ONCE
Qty: 1 ML | Refills: 0 | Status: SHIPPED | OUTPATIENT
Start: 2024-05-03 | End: 2024-05-04

## 2024-05-05 RX ORDER — METHOCARBAMOL 500 MG/1
500 TABLET, FILM COATED ORAL 4 TIMES DAILY
Qty: 40 TABLET | Refills: 0 | Status: SHIPPED | OUTPATIENT
Start: 2024-05-05 | End: 2024-05-15

## 2024-05-06 ENCOUNTER — HOSPITAL ENCOUNTER (OUTPATIENT)
Dept: RADIOLOGY | Facility: HOSPITAL | Age: 34
Discharge: HOME | End: 2024-05-06
Attending: NURSE PRACTITIONER
Payer: COMMERCIAL

## 2024-05-06 DIAGNOSIS — M54.50 ACUTE MIDLINE LOW BACK PAIN WITHOUT SCIATICA: ICD-10-CM

## 2024-05-06 PROCEDURE — 72100 X-RAY EXAM L-S SPINE 2/3 VWS: CPT | Mod: 26,,, | Performed by: RADIOLOGY

## 2024-05-06 PROCEDURE — 72100 X-RAY EXAM L-S SPINE 2/3 VWS: CPT | Mod: TC

## 2024-05-06 RX ORDER — DEXAMETHASONE SODIUM PHOSPHATE 4 MG/ML
8 INJECTION, SOLUTION INTRA-ARTICULAR; INTRALESIONAL; INTRAMUSCULAR; INTRAVENOUS; SOFT TISSUE ONCE
Qty: 2 ML | Refills: 0 | Status: SHIPPED | OUTPATIENT
Start: 2024-05-06 | End: 2024-05-07

## 2024-05-07 ENCOUNTER — TELEPHONE (OUTPATIENT)
Dept: ORTHOPEDICS | Facility: CLINIC | Age: 34
End: 2024-05-07
Payer: COMMERCIAL

## 2024-05-10 ENCOUNTER — TELEPHONE (OUTPATIENT)
Dept: PAIN MEDICINE | Facility: CLINIC | Age: 34
End: 2024-05-10

## 2024-05-10 ENCOUNTER — OFFICE VISIT (OUTPATIENT)
Dept: PAIN MEDICINE | Facility: CLINIC | Age: 34
End: 2024-05-10
Payer: COMMERCIAL

## 2024-05-10 VITALS — WEIGHT: 155 LBS | BODY MASS INDEX: 26.46 KG/M2 | HEIGHT: 64 IN

## 2024-05-10 DIAGNOSIS — M54.9 DORSALGIA, UNSPECIFIED: Primary | ICD-10-CM

## 2024-05-10 DIAGNOSIS — M54.17 LUMBOSACRAL RADICULOPATHY: Primary | ICD-10-CM

## 2024-05-10 DIAGNOSIS — M54.17 LUMBOSACRAL RADICULOPATHY: ICD-10-CM

## 2024-05-10 PROCEDURE — 1159F MED LIST DOCD IN RCRD: CPT | Mod: CPTII,S$GLB,, | Performed by: STUDENT IN AN ORGANIZED HEALTH CARE EDUCATION/TRAINING PROGRAM

## 2024-05-10 PROCEDURE — 3008F BODY MASS INDEX DOCD: CPT | Mod: CPTII,S$GLB,, | Performed by: STUDENT IN AN ORGANIZED HEALTH CARE EDUCATION/TRAINING PROGRAM

## 2024-05-10 PROCEDURE — 99999 PR PBB SHADOW E&M-EST. PATIENT-LVL IV: CPT | Mod: PBBFAC,,, | Performed by: STUDENT IN AN ORGANIZED HEALTH CARE EDUCATION/TRAINING PROGRAM

## 2024-05-10 PROCEDURE — 99204 OFFICE O/P NEW MOD 45 MIN: CPT | Mod: S$GLB,,, | Performed by: STUDENT IN AN ORGANIZED HEALTH CARE EDUCATION/TRAINING PROGRAM

## 2024-05-10 PROCEDURE — 1160F RVW MEDS BY RX/DR IN RCRD: CPT | Mod: CPTII,S$GLB,, | Performed by: STUDENT IN AN ORGANIZED HEALTH CARE EDUCATION/TRAINING PROGRAM

## 2024-05-10 RX ORDER — MELOXICAM 15 MG/1
15 TABLET ORAL DAILY
Qty: 30 TABLET | Refills: 2 | Status: SHIPPED | OUTPATIENT
Start: 2024-05-10 | End: 2024-08-08

## 2024-05-10 NOTE — TELEPHONE ENCOUNTER
Types of orders made on 05/10/2024: Imaging, Medications, Procedure Request      Order Date:5/10/2024   Ordering User:BETTIE TAMAYO [[id number]]   Encounter Provider:Bettie Tamayo MD [[id number]]      Authorizing Provider: Bettie Tamayo MD [[id number]]   Department:[facility] PAIN MANAGEMENT[[id number]]      Common Order Information   Procedure -> Transforaminal Injection (Specify level and laterality) Cmt: L             TFESI L5-S1 and S1      Order Specific Information   Order: Procedure Order to Pain Management [Custom: [id number]]  Order #:          [id number]: 1 FUTURE     Priority: Routine  Class: Clinic Performed        Future Order Information       Expires on:05/10/2025            Expected by:05/10/2024                   Associated Diagnoses       M54.17 Lumbosacral radiculopathy       Facility Name: -> [place]          Follow-up: -> 2 weeks              Priority: Routine  Class: Clinic Performed     Future Order Information       Expires on:05/10/2025            Expected by:05/10/2024                   Associated Di   agnoses       M54.17 Lumbosacral radiculopathy       Procedure -> Transforaminal Injection (Specify level and laterality) Cmt: L                 TFESI L5-S1 and S1

## 2024-05-10 NOTE — PROGRESS NOTES
[place] - Department    Placido Eden MD      First Office Visit: 5/10/24  Today' Date: 5/10/2024  Last Office Visit: None    Chief complaint: L leg pain     HPI: Pt is a pleasant 33 y.o., who presents for evaluation. Referred by Avani Montanez NP. Pt complains of L leg pain and L sided back pain since 2 wks ago. Pt has had a back injury years ago and disc issues in the past. States the pain goes down the back of the L leg to the feet. States the bottom of the L foot is affected. Has tried a steroid dose pack, toradol, decadron, chiropractor therapy and years of HEP. XR 6 wks ago shows degenerative changes and disc height loss at L4-5 and L5-S1. No BB changes. Is doing HEP.         Pain disability index score: 53  Pain score: 6    Relevant Imaging/ Testing:   XR L-spine 5/24    Procedures: None    Date of board of pharmacy review:5/10/2024  Date of opioid risk screening/ pain psych: None  Date of opioid agreement and consent: None  Date of urine drug screen: None  Date of random pill count: None     was reviewed today: reviewed, no concerns     Prescribed medications: None    See EHR for  PMH, PSH, FH, SH, Medications and Allergy    ROS:  Positive for pain  ROS     PE:  There were no vitals filed for this visit.  General: Pleasant, no distress  HEENT: NC/ AT. PERRLA  CV: Radial pulses intact  Pulm: No distress  Ext: No edema    Physical Exam     Neuromusculoskeletal:  Head: NC, AT. PERRLA  Neck: Intact range of motions  Shoulder: Intact range of motion  Lumbar: Intact range of motion. Bilat Facet loading. Min Tenderness. Pos SL on L. No pain with flexion. No pain with extension.   Hip: Intact range of motion  SI: Level  Knee: Intact range of motion  Reflexes: normal Knee  Strength: 5/5 globally   Sensory: Grossly intact   Skin: No bruising, erythema  Gait: Normal      Impression:  L leg pain  Back pain (L side)  Relevant History  BMI 26.61  Hypothyroidism         Plan:  Discussed options  Imaging/ relevant  records viewed/ reviewed/ discussed  Imaging results viewed and reviewed (noted above)/ reviewed with patient   reviewed  Cont HEP  Mobic  MR L-spine  L TFESI L5-S1 and S1   Re-eval after  Consider ILESI L5-S1 directed to L   Consider tramadol for short term pain relief - pt declines currently       Prescribed medications:  1. Mobic     The impression and plan were discussed and explained in detail. All the questions were answered. Education was provided accordingly.     The procedure was explained in detail, along with risks and potential side effects.      Follow-up:  For procedure     Liv Santos MD

## 2024-05-10 NOTE — H&P (VIEW-ONLY)
[place] - Department    Placido Eden MD      First Office Visit: 5/10/24  Today' Date: 5/10/2024  Last Office Visit: None    Chief complaint: L leg pain     HPI: Pt is a pleasant 33 y.o., who presents for evaluation. Referred by Avani Montanez NP. Pt complains of L leg pain and L sided back pain since 2 wks ago. Pt has had a back injury years ago and disc issues in the past. States the pain goes down the back of the L leg to the feet. States the bottom of the L foot is affected. Has tried a steroid dose pack, toradol, decadron, chiropractor therapy and years of HEP. XR 6 wks ago shows degenerative changes and disc height loss at L4-5 and L5-S1. No BB changes. Is doing HEP.         Pain disability index score: 53  Pain score: 6    Relevant Imaging/ Testing:   XR L-spine 5/24    Procedures: None    Date of board of pharmacy review:5/10/2024  Date of opioid risk screening/ pain psych: None  Date of opioid agreement and consent: None  Date of urine drug screen: None  Date of random pill count: None     was reviewed today: reviewed, no concerns     Prescribed medications: None    See EHR for  PMH, PSH, FH, SH, Medications and Allergy    ROS:  Positive for pain  ROS     PE:  There were no vitals filed for this visit.  General: Pleasant, no distress  HEENT: NC/ AT. PERRLA  CV: Radial pulses intact  Pulm: No distress  Ext: No edema    Physical Exam     Neuromusculoskeletal:  Head: NC, AT. PERRLA  Neck: Intact range of motions  Shoulder: Intact range of motion  Lumbar: Intact range of motion. Bilat Facet loading. Min Tenderness. Pos SL on L. No pain with flexion. No pain with extension.   Hip: Intact range of motion  SI: Level  Knee: Intact range of motion  Reflexes: normal Knee  Strength: 5/5 globally   Sensory: Grossly intact   Skin: No bruising, erythema  Gait: Normal      Impression:  L leg pain  Back pain (L side)  Relevant History  BMI 26.61  Hypothyroidism         Plan:  Discussed options  Imaging/ relevant  records viewed/ reviewed/ discussed  Imaging results viewed and reviewed (noted above)/ reviewed with patient   reviewed  Cont HEP  Mobic  MR L-spine  L TFESI L5-S1 and S1   Re-eval after  Consider ILESI L5-S1 directed to L   Consider tramadol for short term pain relief - pt declines currently       Prescribed medications:  1. Mobic     The impression and plan were discussed and explained in detail. All the questions were answered. Education was provided accordingly.     The procedure was explained in detail, along with risks and potential side effects.      Follow-up:  For procedure     Liv Santos MD

## 2024-05-10 NOTE — H&P (VIEW-ONLY)
[place] - Department    Placido Eden MD      First Office Visit: 5/10/24  Today' Date: 5/10/2024  Last Office Visit: None    Chief complaint: L leg pain     HPI: Pt is a pleasant 33 y.o., who presents for evaluation. Referred by Avani Montanez NP. Pt complains of L leg pain and L sided back pain since 2 wks ago. Pt has had a back injury years ago and disc issues in the past. States the pain goes down the back of the L leg to the feet. States the bottom of the L foot is affected. Has tried a steroid dose pack, toradol, decadron, chiropractor therapy and years of HEP. XR 6 wks ago shows degenerative changes and disc height loss at L4-5 and L5-S1. No BB changes. Is doing HEP.         Pain disability index score: 53  Pain score: 6    Relevant Imaging/ Testing:   XR L-spine 5/24    Procedures: None    Date of board of pharmacy review:5/10/2024  Date of opioid risk screening/ pain psych: None  Date of opioid agreement and consent: None  Date of urine drug screen: None  Date of random pill count: None     was reviewed today: reviewed, no concerns     Prescribed medications: None    See EHR for  PMH, PSH, FH, SH, Medications and Allergy    ROS:  Positive for pain  ROS     PE:  There were no vitals filed for this visit.  General: Pleasant, no distress  HEENT: NC/ AT. PERRLA  CV: Radial pulses intact  Pulm: No distress  Ext: No edema    Physical Exam     Neuromusculoskeletal:  Head: NC, AT. PERRLA  Neck: Intact range of motions  Shoulder: Intact range of motion  Lumbar: Intact range of motion. Bilat Facet loading. Min Tenderness. Pos SL on L. No pain with flexion. No pain with extension.   Hip: Intact range of motion  SI: Level  Knee: Intact range of motion  Reflexes: normal Knee  Strength: 5/5 globally   Sensory: Grossly intact   Skin: No bruising, erythema  Gait: Normal      Impression:  L leg pain  Back pain (L side)  Relevant History  BMI 26.61  Hypothyroidism         Plan:  Discussed options  Imaging/ relevant  records viewed/ reviewed/ discussed  Imaging results viewed and reviewed (noted above)/ reviewed with patient   reviewed  Cont HEP  Mobic  MR L-spine  L TFESI L5-S1 and S1   Re-eval after  Consider ILESI L5-S1 directed to L   Consider tramadol for short term pain relief - pt declines currently       Prescribed medications:  1. Mobic     The impression and plan were discussed and explained in detail. All the questions were answered. Education was provided accordingly.     The procedure was explained in detail, along with risks and potential side effects.      Follow-up:  For procedure     Liv Santos MD

## 2024-05-11 ENCOUNTER — HOSPITAL ENCOUNTER (OUTPATIENT)
Dept: RADIOLOGY | Facility: HOSPITAL | Age: 34
Discharge: HOME | End: 2024-05-11
Attending: STUDENT IN AN ORGANIZED HEALTH CARE EDUCATION/TRAINING PROGRAM
Payer: COMMERCIAL

## 2024-05-11 ENCOUNTER — PATIENT MESSAGE (OUTPATIENT)
Dept: PAIN MEDICINE | Facility: CLINIC | Age: 34
End: 2024-05-11
Payer: COMMERCIAL

## 2024-05-11 DIAGNOSIS — M54.9 DORSALGIA, UNSPECIFIED: ICD-10-CM

## 2024-05-11 PROCEDURE — 72148 MRI LUMBAR SPINE W/O DYE: CPT | Mod: 26,,, | Performed by: RADIOLOGY

## 2024-05-11 PROCEDURE — 72148 MRI LUMBAR SPINE W/O DYE: CPT | Mod: TC

## 2024-05-13 ENCOUNTER — TELEPHONE (OUTPATIENT)
Dept: PAIN MEDICINE | Facility: CLINIC | Age: 34
End: 2024-05-13
Payer: COMMERCIAL

## 2024-05-13 ENCOUNTER — PATIENT MESSAGE (OUTPATIENT)
Dept: PAIN MEDICINE | Facility: CLINIC | Age: 34
End: 2024-05-13
Payer: COMMERCIAL

## 2024-05-13 RX ORDER — LEVOTHYROXINE SODIUM 150 UG/1
TABLET ORAL
Qty: 90 TABLET | Refills: 3 | Status: SHIPPED | OUTPATIENT
Start: 2024-05-13

## 2024-05-13 RX ORDER — ERGOCALCIFEROL 1.25 MG/1
50000 CAPSULE ORAL
Qty: 13 CAPSULE | Refills: 3 | Status: SHIPPED | OUTPATIENT
Start: 2024-05-13

## 2024-05-13 NOTE — TELEPHONE ENCOUNTER
Called and spoke with the patient and let her know about her imaging results and she stated she understood and will be sending [employer] paperwork to get done soon

## 2024-05-13 NOTE — TELEPHONE ENCOUNTER
I cannot move pts injections up until authorization is complete. Please advise on the rest of the message.

## 2024-05-14 NOTE — OR NURSING
Coworker attempted phone preadmit earlier today. VM left. Patient returned call and preop instructions reviewed with verbalized understanding. Also sent to [facility] per coworker.

## 2024-05-16 ENCOUNTER — PATIENT MESSAGE (OUTPATIENT)
Dept: PAIN MEDICINE | Facility: CLINIC | Age: 34
End: 2024-05-16
Payer: COMMERCIAL

## 2024-05-16 DIAGNOSIS — M54.17 LUMBOSACRAL RADICULOPATHY: Primary | ICD-10-CM

## 2024-05-17 ENCOUNTER — HOSPITAL ENCOUNTER (OUTPATIENT)
Facility: HOSPITAL | Age: 34
Discharge: HOME | End: 2024-05-17
Attending: STUDENT IN AN ORGANIZED HEALTH CARE EDUCATION/TRAINING PROGRAM | Admitting: STUDENT IN AN ORGANIZED HEALTH CARE EDUCATION/TRAINING PROGRAM
Payer: COMMERCIAL

## 2024-05-17 VITALS
TEMPERATURE: 98 F | SYSTOLIC BLOOD PRESSURE: 114 MMHG | WEIGHT: 158 LBS | BODY MASS INDEX: 26.98 KG/M2 | HEIGHT: 64 IN | RESPIRATION RATE: 18 BRPM | HEART RATE: 90 BPM | DIASTOLIC BLOOD PRESSURE: 74 MMHG | OXYGEN SATURATION: 99 %

## 2024-05-17 DIAGNOSIS — M54.16 LUMBAR RADICULITIS: ICD-10-CM

## 2024-05-17 DIAGNOSIS — M54.17 LUMBOSACRAL RADICULOPATHY: Primary | ICD-10-CM

## 2024-05-17 LAB
B-HCG UR QL: NEGATIVE
CTP QC/QA: YES

## 2024-05-17 PROCEDURE — 63600175 PHARM REV CODE 636 W HCPCS: Performed by: STUDENT IN AN ORGANIZED HEALTH CARE EDUCATION/TRAINING PROGRAM

## 2024-05-17 PROCEDURE — 64483 NJX AA&/STRD TFRM EPI L/S 1: CPT | Mod: PN,LT,, | Performed by: STUDENT IN AN ORGANIZED HEALTH CARE EDUCATION/TRAINING PROGRAM

## 2024-05-17 PROCEDURE — 99152 MOD SED SAME PHYS/QHP 5/>YRS: CPT | Performed by: STUDENT IN AN ORGANIZED HEALTH CARE EDUCATION/TRAINING PROGRAM

## 2024-05-17 PROCEDURE — 81025 URINE PREGNANCY TEST: CPT | Performed by: STUDENT IN AN ORGANIZED HEALTH CARE EDUCATION/TRAINING PROGRAM

## 2024-05-17 PROCEDURE — 36000705 HC OR TIME LEV I EA ADD 15 MIN: Performed by: STUDENT IN AN ORGANIZED HEALTH CARE EDUCATION/TRAINING PROGRAM

## 2024-05-17 PROCEDURE — 64484 NJX AA&/STRD TFRM EPI L/S EA: CPT | Mod: PN,LT,, | Performed by: STUDENT IN AN ORGANIZED HEALTH CARE EDUCATION/TRAINING PROGRAM

## 2024-05-17 PROCEDURE — 71000015 HC POSTOP RECOV 1ST HR: Performed by: STUDENT IN AN ORGANIZED HEALTH CARE EDUCATION/TRAINING PROGRAM

## 2024-05-17 PROCEDURE — 36000704 HC OR TIME LEV I 1ST 15 MIN: Performed by: STUDENT IN AN ORGANIZED HEALTH CARE EDUCATION/TRAINING PROGRAM

## 2024-05-17 PROCEDURE — 25000003 PHARM REV CODE 250: Performed by: STUDENT IN AN ORGANIZED HEALTH CARE EDUCATION/TRAINING PROGRAM

## 2024-05-17 PROCEDURE — 25500020 PHARM REV CODE 255: Performed by: STUDENT IN AN ORGANIZED HEALTH CARE EDUCATION/TRAINING PROGRAM

## 2024-05-17 RX ORDER — LIDOCAINE HYDROCHLORIDE 10 MG/ML
1 INJECTION, SOLUTION EPIDURAL; INFILTRATION; INTRACAUDAL; PERINEURAL ONCE
Status: DISCONTINUED | OUTPATIENT
Start: 2024-05-17 | End: 2024-05-17 | Stop reason: HOSPADM

## 2024-05-17 RX ORDER — SODIUM CHLORIDE, SODIUM LACTATE, POTASSIUM CHLORIDE, CALCIUM CHLORIDE 600; 310; 30; 20 MG/100ML; MG/100ML; MG/100ML; MG/100ML
INJECTION, SOLUTION INTRAVENOUS CONTINUOUS
Status: DISCONTINUED | OUTPATIENT
Start: 2024-05-17 | End: 2024-05-17 | Stop reason: HOSPADM

## 2024-05-17 RX ORDER — TRAMADOL HYDROCHLORIDE 50 MG/1
50 TABLET ORAL EVERY 6 HOURS
COMMUNITY
End: 2024-06-10

## 2024-05-17 RX ORDER — DEXAMETHASONE SODIUM PHOSPHATE 100 MG/10ML
INJECTION INTRAMUSCULAR; INTRAVENOUS
Status: DISCONTINUED | OUTPATIENT
Start: 2024-05-17 | End: 2024-05-17 | Stop reason: HOSPADM

## 2024-05-17 RX ORDER — MIDAZOLAM HYDROCHLORIDE 1 MG/ML
INJECTION INTRAMUSCULAR; INTRAVENOUS
Status: DISCONTINUED | OUTPATIENT
Start: 2024-05-17 | End: 2024-05-17 | Stop reason: HOSPADM

## 2024-05-17 RX ORDER — FENTANYL CITRATE 50 UG/ML
INJECTION, SOLUTION INTRAMUSCULAR; INTRAVENOUS
Status: DISCONTINUED | OUTPATIENT
Start: 2024-05-17 | End: 2024-05-17 | Stop reason: HOSPADM

## 2024-05-17 RX ORDER — LIDOCAINE HYDROCHLORIDE 10 MG/ML
INJECTION, SOLUTION EPIDURAL; INFILTRATION; INTRACAUDAL; PERINEURAL
Status: DISCONTINUED | OUTPATIENT
Start: 2024-05-17 | End: 2024-05-17 | Stop reason: HOSPADM

## 2024-05-17 RX ADMIN — SODIUM CHLORIDE, SODIUM GLUCONATE, SODIUM ACETATE, POTASSIUM CHLORIDE AND MAGNESIUM CHLORIDE: 526; 502; 368; 37; 30 INJECTION, SOLUTION INTRAVENOUS at 07:05

## 2024-05-17 NOTE — DISCHARGE INSTRUCTIONS
Pain injection instructions:     This procedure may take a couple weeks to relieve pain    No driving for 24 hrs.   Activity as tolerated- gradually increase activities.  Dont lift over 10 lbs for 24 hrs   No heat at injection sites x 2 days. No heating pads, hot tubs, saunas, or swimming in any body of water or pool for 2 days.  Use ice pack for mild swelling and for comfort , apply for 20 minutes, remove for 20 minute intervals. No direct contact of ice itself  to skin.  May shower today. No tub baths for two days.      Resume Aspirin, Plavix, or Coumadin the day after the procedure unless otherwise instructed.   If diabetic,monitor your glucose carefully as steroids can increase your glucose level    Seek immediate medical help for:   Severe increase in your usual pain or appearance of new pain.  Prolonged (mor than 8 hours) or increasing weakness or numbness in the legs or arms.  .    Fever above 101 ,Drainage,redness,active bleeding, or increased swelling at the injection site.  Headache, shortness of breath, chest pain, or breathing problems.

## 2024-05-17 NOTE — PLAN OF CARE
Pt prepared for surgery. Pt resting in bed, with family/friend at bedside. Family signed up for text messaging. Belongings brought over to post op cabinet. IS teaching performed. Fall risk agreement reviewed and armband placed.

## 2024-05-17 NOTE — OP NOTE
Patient: Juana Jean                                                    MRN: [id number]  : 1990                                              Date of procedure: 2024      Pre Procedure Diagnosis: Lumbar, Lumbosacral radiculopathy    Post Procedure Diagnosis: Same    Procedure:   Transforaminal Epidural Steroid Injection Under Fluoroscopy at Left L5-S1   Transforaminal Epidural Steroid Injection Under Fluoroscopy at Left S1    Attending: Liv Santos MD    Local Anesthetic Injected: Lidocaine 1% 6ml    Sedation Medications: See RN note     Estimated Blood Loss: None    Complication: None        Procedure:  After informed consent was obtained, patient was taken to the fluoroscopy suite and placed in a prone position.  The skin was prepped and draped in the usual sterile fashion using chlorhexidine.  Anatomical landmarks were identified with the aid of fluoroscopy, and the skin and subcutaneous tissue overlying the neural foramen was infiltrated with 3mL of 1% Lidocaine using a 25-gauge 1.5 inch needle.  A 22-gauge 5-inch needle was advanced with the aid of fluoroscopy in an oblique view such that the needle tip entered the neural foramen.   Needle placement was confirmed with fluoroscopy in PA and Lateral views.  After a negative aspiration, 0.5mL of contrast was injected.  The contrast delineated the nerve root as well as the epidural spread.  After negative aspiration, an injectate consisting of 0.5mL of 10mg/mL of Dexamethasone, 0.5mL of 1% preservative free lidocaine and 3mL of preservative normal saline was injected.  This was repeated at the other level. Patient tolerated the procedure well and all needles were removed intact.  There were no complications.    Patient was observed in recovery and discharged home with written instructions under supervision in stable condition.

## 2024-05-17 NOTE — DISCHARGE SUMMARY
[facility]  Discharge Note  Short Stay    Procedure(s) (LRB):  Injection,steroid,epidural,transforaminal approach (Left)      OUTCOME: Patient tolerated treatment/procedure well without complication and is now ready for discharge.    DISPOSITION: Home or Self Care    FINAL DIAGNOSIS:  <principal problem not specified>    FOLLOWUP: In clinic    DISCHARGE INSTRUCTIONS:    Discharge Procedure Orders   Notify your health care provider if you experience any of the following:  temperature >100.4     Notify your health care provider if you experience any of the following:  severe uncontrolled pain     Notify your health care provider if you experience any of the following:  redness, tenderness, or signs of infection (pain, swelling, redness, odor or green/yellow discharge around incision site)     Activity as tolerated        TIME SPENT ON DISCHARGE: 20 minutes

## 2024-05-17 NOTE — INTERVAL H&P NOTE
The patient has been examined and the H&P has been reviewed:    I concur with the findings and no changes have occurred since H&P was written.    Procedure risks, benefits and alternative options discussed and understood by patient/family.          There are no hospital problems to display for this patient.    This patient has been cleared for surgery in an ambulatory surgical facility    ASA 3,  Mallampatti Score 3  No history of anesthetic complications  Plan for RN IV sedation   
stated

## 2024-05-20 ENCOUNTER — PATIENT MESSAGE (OUTPATIENT)
Dept: PAIN MEDICINE | Facility: CLINIC | Age: 34
End: 2024-05-20
Payer: COMMERCIAL

## 2024-05-20 ENCOUNTER — OFFICE VISIT (OUTPATIENT)
Dept: ORTHOPEDICS | Facility: CLINIC | Age: 34
End: 2024-05-20
Payer: COMMERCIAL

## 2024-05-20 ENCOUNTER — TELEPHONE (OUTPATIENT)
Dept: PAIN MEDICINE | Facility: CLINIC | Age: 34
End: 2024-05-20
Payer: COMMERCIAL

## 2024-05-20 VITALS — BODY MASS INDEX: 26.98 KG/M2 | WEIGHT: 158.06 LBS | HEIGHT: 64 IN

## 2024-05-20 DIAGNOSIS — M54.17 LUMBOSACRAL RADICULOPATHY: Primary | ICD-10-CM

## 2024-05-20 DIAGNOSIS — M54.16 LUMBAR RADICULOPATHY, ACUTE: ICD-10-CM

## 2024-05-20 DIAGNOSIS — M51.16 LUMBAR DISC HERNIATION WITH RADICULOPATHY: Primary | ICD-10-CM

## 2024-05-20 PROCEDURE — 1159F MED LIST DOCD IN RCRD: CPT | Mod: CPTII,S$GLB,, | Performed by: ORTHOPAEDIC SURGERY

## 2024-05-20 PROCEDURE — 3008F BODY MASS INDEX DOCD: CPT | Mod: CPTII,S$GLB,, | Performed by: ORTHOPAEDIC SURGERY

## 2024-05-20 PROCEDURE — 1160F RVW MEDS BY RX/DR IN RCRD: CPT | Mod: CPTII,S$GLB,, | Performed by: ORTHOPAEDIC SURGERY

## 2024-05-20 PROCEDURE — 99213 OFFICE O/P EST LOW 20 MIN: CPT | Mod: S$GLB,,, | Performed by: ORTHOPAEDIC SURGERY

## 2024-05-20 RX ORDER — GABAPENTIN 300 MG/1
300 CAPSULE ORAL 3 TIMES DAILY
Qty: 60 CAPSULE | Refills: 0 | Status: SHIPPED | OUTPATIENT
Start: 2024-05-20 | End: 2024-06-05 | Stop reason: SDUPTHER

## 2024-05-20 RX ORDER — HYDROCODONE BITARTRATE AND ACETAMINOPHEN 10; 325 MG/1; MG/1
1 TABLET ORAL EVERY 6 HOURS PRN
Qty: 28 TABLET | Refills: 0 | Status: SHIPPED | OUTPATIENT
Start: 2024-05-20 | End: 2024-05-27

## 2024-05-20 NOTE — PROGRESS NOTES
Subjective:       Patient ID: Juana Jean is a 33 y.o. female.    Chief Complaint: Pain of the Spine (Lumbar pain with radiation through left leg for about a month. Pain has progressed over time. States the radiating pain in her leg is a sharp stabbing and turning sensation and the left foot is numb with occasional pin and needle sensation.MRI 5/11/24 and JOSE with Dr. Ocasio 5.17.24 States prolonged walking or weightbearing increases pain while laying down dose offer some relief)      History of Present Illness    Prior to meeting with the patient I reviewed the medical chart in [facility]. This included reviewing the previous progress notes from our office, review of the patient's last appointment with their primary care provider, review of any visits to the emergency room, and review of any pain management appointments or procedures.   This patient started having some symptoms of low back pain in March and saw a chiropractor about 3 weeks ago she began having severe left leg pain and saw Dr. Santos the pain management doctor who ordered an MRI and did a left L5-S1 transforaminal epidural steroid injection on May 17, 2024.  She has not noticed any significant relief in her leg symptoms at this point in time.    Current Medications  Current Outpatient Medications   Medication Sig Dispense Refill    meloxicam (MOBIC) 15 MG tablet Take 1 tablet (15 mg total) by mouth once daily. 30 tablet 2    busPIRone (BUSPAR) 10 MG tablet Take 1 tablet (10 mg total) by mouth 3 (three) times daily. 90 tablet 11    ergocalciferol (ERGOCALCIFEROL) 50,000 unit Cap Take 1 capsule (50,000 Units total) by mouth every 7 days. 13 capsule 3    FLUoxetine 20 MG capsule Take 1 capsule by mouth twice daily 180 capsule 3    gabapentin (NEURONTIN) 300 MG capsule Take 1 capsule (300 mg total) by mouth 3 (three) times daily. for 20 days 60 capsule 0    HYDROcodone-acetaminophen (NORCO)  mg per tablet Take 1 tablet by mouth every 6 (six) hours  as needed for Pain. 28 tablet 0    levothyroxine (SYNTHROID) 150 MCG tablet Take 1 tablet by mouth once daily in the morning. 90 tablet 3    ondansetron (ZOFRAN-ODT) 8 MG TbDL Take 1 tablet (8 mg total) by mouth every 12 (twelve) hours as needed (nausea/vomiting). 20 tablet 1    traMADoL (ULTRAM) 50 mg tablet Take 50 mg by mouth every 6 (six) hours.      tretinoin (RETIN-A) 0.05 % cream Apply topically every evening. 45 g 11     No current facility-administered medications for this visit.       Allergies  Review of patient's allergies indicates:  No Known Allergies    Past Medical History  Past Medical History:   Diagnosis Date    Mental disorder     anxiety    Thyroid disease        Surgical History  Past Surgical History:   Procedure Laterality Date     SECTION N/A 2020    Procedure:  SECTION;  Surgeon: Placido Eden MD;  Location: [facility] L&D;  Service: OB/GYN;  Laterality: N/A;     SECTION      TRANSFORAMINAL EPIDURAL INJECTION OF STEROID Left 2024    Procedure: Injection,steroid,epidural,transforaminal approach;  Surgeon: Liv Santos MD;  Location: [facility] OR;  Service: Anesthesiology;  Laterality: Left;  L5-s1 and s1 TFESI    WISDOM TOOTH EXTRACTION         Family History:   Family History   Problem Relation Name Age of Onset    Heart disease Mother      Hyperlipidemia Mother      Diabetes Mother      Heart disease Father      Hyperlipidemia Father      Diabetes Sister      Glaucoma Maternal Grandfather         Social History:   Social History     Socioeconomic History    Marital status:    Tobacco Use    Smoking status: Never    Smokeless tobacco: Never   Substance and Sexual Activity    Alcohol use: Yes     Comment: occasionally    Drug use: Never    Sexual activity: Yes     Partners: Male     Social Determinants of Health     Stress: No Stress Concern Present (2020)    [employer] - Occupational Stress Questionnaire     Feeling of  Stress : Not at all       Hospitalization/Major Diagnostic Procedure:     Review of Systems     General/Constitutional:  Chills denies. Fatigue denies. Fever denies. Weight gain denies. Weight loss denies.    Respiratory:  Shortness of breath denies.    Cardiovascular:  Chest pain denies.    Gastrointestinal:  Constipation denies. Diarrhea denies. Nausea denies. Vomiting denies.     Hematology:  Easy bruising denies. Prolonged bleeding denies.     Genitourinary:  Frequent urination denies. Pain in lower back denies. Painful urination denies.     Musculoskeletal:  See HPI for details    Skin:  Rash denies.    Neurologic:  Dizziness denies. Gait abnormalities denies. Seizures denies. Tingling/Numbess denies.    Psychiatric:  Anxiety denies. Depressed mood denies.     Objective:   Vital Signs: There were no vitals filed for this visit.     Physical Exam      General Examination:     Constitutional: The patient is alert and oriented to lace person and time. Mood is pleasant.     Head/Face: Normal facial features normal eyebrows    Eyes: Normal extraocular motion bilaterally    Lungs: Respirations are equal and unlabored    Gait is coordinated.    Cardiovascular: There are no swelling or varicosities present.    Lymphatic: Negative for adenopathy    Skin: Normal    Neurological: Level of consciousness normal. Oriented to place person and time and situation    Psychiatric: Oriented to time place person and situation    Tenderness spasm lower lumbar spine left side straight leg raising markedly positive on the left side motor exam grade 4 5 weakness of extensor hallucis longus on the left and ankle dorsiflexors.  Reflexes symmetrical but hypoactive.    XRAY Report/ Interpretation :  MRI lumbar spine personally reviewed large disc herniation central and to the left at the L4-5 level please see report for details      Assessment:       1. Lumbar disc herniation with radiculopathy    2. Lumbar radiculopathy, acute         Plan:       Juana was seen today for pain.    Diagnoses and all orders for this visit:    Lumbar disc herniation with radiculopathy  Comments:  L4-5  Orders:  -     Procedure Order to Pain Management; Future    Lumbar radiculopathy, acute  -     Ambulatory referral/consult to Orthopedics    Other orders  -     HYDROcodone-acetaminophen (NORCO)  mg per tablet; Take 1 tablet by mouth every 6 (six) hours as needed for Pain.  -     gabapentin (NEURONTIN) 300 MG capsule; Take 1 capsule (300 mg total) by mouth 3 (three) times daily. for 20 days         Follow up in about 9 days (around 5/29/2024) for lumbar radiculopathy, JOSE with Dr Santos.    I have requested that Dr. Santos consider an interlaminar epidural steroid injection at the L4-5 level.  We will start gabapentin.  I did give her prescription for hydrocodone for severe pain but she has not really eager to take pain medication.  Hopefully she can have this injection soon.    The risk associated with the spinal condition and an worsening of the condition was discussed with the patient expressed a thorough understanding.  The patient was warned about the possible development of cauda equina syndrome and its symptoms which include but are not limited to bowel or bladder dysfunction sexual dysfunction increasing pain increasing extremity numbness or weakness and saddle anesthesia.  The patient was advised to either contact me immediately or to go to the nearest hospital emergency room if symptoms of cauda equina syndrome with to develop.  The patient expressed an understanding of these instructions.    Treatment options were discussed with regards to the nature of the medical condition. Conservative pain intervention and surgical options were discussed in detail. The probability of success of each separate treatment option was discussed. The patient expressed a clear understanding of the treatment options. With regards to surgery, the procedure risk, benefits,  complications, and outcomes were discussed. No guarantees were given with regards to surgical outcome.   The risk of complications, morbidity, and mortality of patient management decisions have been made at the time of this visit. These are associated with the patient's problems, diagnostic procedures and treatment options. This includes the possible management options selected and those considered but not selected by the patient after shared medical decision making we discussed with the patient.   This note was created using Dragon voice recognition software that occasionally misinterpreted phrases or words.

## 2024-05-20 NOTE — TELEPHONE ENCOUNTER
Order Date:5/20/2024   Ordering User:RAMSEY NEVILLE [[id number]]   Encounter Provider:Ramsey Neville MD [[id number]]   Authorizing Provider: Ramsey Neville MD [[id number]]   Department:[facility][[id number]]      Common Order Information   Procedure -> Epidural Injection (specify level) Cmt: Interlaminar L4-L5      Order Specific Information   Order: Procedure Order to Pain Management [Custom: REF   168]  Order #:          [id number]: 1 FUTURE     Priority: Routine  Class: Clinic Performed     Future Order Information       Expires on:05/20/2025            Expected by:05/20/2024                   Associated Diagnoses       M51.36 Bulging lumbar disc       Physician -> Liv Santos          Facility Name: -> [place]              Priority: Routine  Class: Clinic Performed     Future Order Informati   on       Expires on:05/20/2025            Expected by:05/20/2024                   Associated Diagnoses       M51.36 Bulging lumbar disc       Procedure -> Epidural Injection (specify level) Cmt: Interlaminar L4-L5          Physician -> Liv Santos   Would you like me to schedule? I dont have anything until 06/17 and it hasn't been a week since last procedure .

## 2024-05-27 ENCOUNTER — HOSPITAL ENCOUNTER (OUTPATIENT)
Facility: HOSPITAL | Age: 34
Discharge: HOME | End: 2024-05-27
Attending: STUDENT IN AN ORGANIZED HEALTH CARE EDUCATION/TRAINING PROGRAM | Admitting: STUDENT IN AN ORGANIZED HEALTH CARE EDUCATION/TRAINING PROGRAM
Payer: COMMERCIAL

## 2024-05-27 DIAGNOSIS — M54.16 LUMBAR RADICULITIS: ICD-10-CM

## 2024-05-27 DIAGNOSIS — M54.16 LUMBAR RADICULOPATHY: Primary | ICD-10-CM

## 2024-05-27 LAB
B-HCG UR QL: NEGATIVE
CTP QC/QA: YES

## 2024-05-27 PROCEDURE — 25000003 PHARM REV CODE 250: Performed by: STUDENT IN AN ORGANIZED HEALTH CARE EDUCATION/TRAINING PROGRAM

## 2024-05-27 PROCEDURE — 99152 MOD SED SAME PHYS/QHP 5/>YRS: CPT | Performed by: STUDENT IN AN ORGANIZED HEALTH CARE EDUCATION/TRAINING PROGRAM

## 2024-05-27 PROCEDURE — 62323 NJX INTERLAMINAR LMBR/SAC: CPT | Performed by: STUDENT IN AN ORGANIZED HEALTH CARE EDUCATION/TRAINING PROGRAM

## 2024-05-27 PROCEDURE — 62323 NJX INTERLAMINAR LMBR/SAC: CPT | Mod: ,,, | Performed by: STUDENT IN AN ORGANIZED HEALTH CARE EDUCATION/TRAINING PROGRAM

## 2024-05-27 PROCEDURE — 63600175 PHARM REV CODE 636 W HCPCS: Performed by: STUDENT IN AN ORGANIZED HEALTH CARE EDUCATION/TRAINING PROGRAM

## 2024-05-27 PROCEDURE — 81025 URINE PREGNANCY TEST: CPT | Performed by: STUDENT IN AN ORGANIZED HEALTH CARE EDUCATION/TRAINING PROGRAM

## 2024-05-27 PROCEDURE — A4216 STERILE WATER/SALINE, 10 ML: HCPCS | Performed by: STUDENT IN AN ORGANIZED HEALTH CARE EDUCATION/TRAINING PROGRAM

## 2024-05-27 PROCEDURE — 25500020 PHARM REV CODE 255: Performed by: STUDENT IN AN ORGANIZED HEALTH CARE EDUCATION/TRAINING PROGRAM

## 2024-05-27 RX ORDER — DEXAMETHASONE SODIUM PHOSPHATE 100 MG/10ML
INJECTION INTRAMUSCULAR; INTRAVENOUS
Status: DISCONTINUED | OUTPATIENT
Start: 2024-05-27 | End: 2024-05-27 | Stop reason: HOSPADM

## 2024-05-27 RX ORDER — LIDOCAINE HYDROCHLORIDE 10 MG/ML
INJECTION, SOLUTION EPIDURAL; INFILTRATION; INTRACAUDAL; PERINEURAL
Status: DISCONTINUED | OUTPATIENT
Start: 2024-05-27 | End: 2024-05-27 | Stop reason: HOSPADM

## 2024-05-27 RX ORDER — FENTANYL CITRATE 50 UG/ML
INJECTION, SOLUTION INTRAMUSCULAR; INTRAVENOUS
Status: DISCONTINUED | OUTPATIENT
Start: 2024-05-27 | End: 2024-05-27 | Stop reason: HOSPADM

## 2024-05-27 RX ORDER — SODIUM CHLORIDE, SODIUM LACTATE, POTASSIUM CHLORIDE, CALCIUM CHLORIDE 600; 310; 30; 20 MG/100ML; MG/100ML; MG/100ML; MG/100ML
INJECTION, SOLUTION INTRAVENOUS CONTINUOUS
Status: DISCONTINUED | OUTPATIENT
Start: 2024-05-27 | End: 2024-05-27 | Stop reason: HOSPADM

## 2024-05-27 RX ORDER — MIDAZOLAM HYDROCHLORIDE 1 MG/ML
INJECTION INTRAMUSCULAR; INTRAVENOUS
Status: DISCONTINUED | OUTPATIENT
Start: 2024-05-27 | End: 2024-05-27 | Stop reason: HOSPADM

## 2024-05-27 RX ORDER — SODIUM CHLORIDE 9 MG/ML
INJECTION, SOLUTION INTRAMUSCULAR; INTRAVENOUS; SUBCUTANEOUS
Status: DISCONTINUED | OUTPATIENT
Start: 2024-05-27 | End: 2024-05-27 | Stop reason: HOSPADM

## 2024-05-27 RX ORDER — LIDOCAINE HYDROCHLORIDE 10 MG/ML
1 INJECTION, SOLUTION EPIDURAL; INFILTRATION; INTRACAUDAL; PERINEURAL ONCE
Status: COMPLETED | OUTPATIENT
Start: 2024-05-27 | End: 2024-05-27

## 2024-05-27 RX ADMIN — LIDOCAINE HYDROCHLORIDE 10 MG: 10 INJECTION, SOLUTION EPIDURAL; INFILTRATION; INTRACAUDAL; PERINEURAL at 01:05

## 2024-05-27 RX ADMIN — SODIUM CHLORIDE, POTASSIUM CHLORIDE, SODIUM LACTATE AND CALCIUM CHLORIDE: 600; 310; 30; 20 INJECTION, SOLUTION INTRAVENOUS at 01:05

## 2024-05-27 NOTE — PLAN OF CARE
Tolerated procedure well. Denies pain at this time. Transferred out of facility via wheelchair to spouse without incident. Discharge instructions and ice pack in hand upon departure.

## 2024-05-27 NOTE — OP NOTE
Patient: Juana Jean                                                    MRN: [id number]  : 1990                                              Date of procedure: 2024    Pre Procedure Diagnosis: Lumbar, lumbosacral radiculopathy    Post Procedure Diagnosis: Same    Procedure: Lumbar Epidural Steroid Injection Under Fluoroscopy at L4-5    Attending: Liv Santos MD    Local Anesthetic Injected: Lidocaine 1% 3 ml    Sedation Medications: See RN note     Estimated Blood Loss: None    Complication: None        Procedure:  After informed consent was obtained, patient was taken to the fluoroscopy suite and placed in a prone position.  The skin was prepped and draped in the usual sterile fashion using chlorhexidine. The skin and subcutaneous tissue overlying the Lumbar vertebral level was infiltrated with 3mLs of 1% Lidocaine using a 25 gauge 1.5 inch needle.  The 20-gauge Tuoehy needle was advanced with the aid of fluoroscopy using the water drop loss of resistance technique at the L4-5 interspinous space using an intralaminer approach.  Proper placement into the epidural space was confirmed by the loss of resistance.  There was no CSF, heme or paresthesias.  After negative aspiration, 0.5mL of contrast was injected.  The contrast confirmed the needle placement by spread delineating the epidural space in multiple views.  Then after negative aspiration, an injectate consisting of 6mLs of 0.5mL 10mg/mL of Dexamethasone, 0.5mL of preservative free lidocaine and 5mL of preservative free normal saline was injected.  Patient tolerated the procedure well and all needles were removed intact.  There were no complications.    Patient was observed in recovery and discharged home under supervision with discharge instructions in stable condition.

## 2024-05-27 NOTE — DISCHARGE SUMMARY
[facility] - Periop Services  Discharge Note  Short Stay    Procedure(s) (LRB):  Injection-steroid-epidural-lumbar (N/A)      OUTCOME: Patient tolerated treatment/procedure well without complication and is now ready for discharge.    DISPOSITION: Home or Self Care    FINAL DIAGNOSIS:  <principal problem not specified>    FOLLOWUP: In clinic    DISCHARGE INSTRUCTIONS:    Discharge Procedure Orders   Notify your health care provider if you experience any of the following:  temperature >100.4     Notify your health care provider if you experience any of the following:  severe uncontrolled pain     Notify your health care provider if you experience any of the following:  redness, tenderness, or signs of infection (pain, swelling, redness, odor or green/yellow discharge around incision site)     Activity as tolerated        TIME SPENT ON DISCHARGE: 20 minutes

## 2024-05-28 ENCOUNTER — PATIENT MESSAGE (OUTPATIENT)
Dept: PAIN MEDICINE | Facility: CLINIC | Age: 34
End: 2024-05-28
Payer: COMMERCIAL

## 2024-05-29 DIAGNOSIS — M54.16 LUMBAR RADICULOPATHY, CHRONIC: Primary | ICD-10-CM

## 2024-05-30 ENCOUNTER — TELEPHONE (OUTPATIENT)
Dept: PAIN MEDICINE | Facility: CLINIC | Age: 34
End: 2024-05-30
Payer: COMMERCIAL

## 2024-05-30 ENCOUNTER — PATIENT MESSAGE (OUTPATIENT)
Dept: PAIN MEDICINE | Facility: CLINIC | Age: 34
End: 2024-05-30
Payer: COMMERCIAL

## 2024-05-30 ENCOUNTER — TELEPHONE (OUTPATIENT)
Dept: NEUROSURGERY | Facility: CLINIC | Age: 34
End: 2024-05-30
Payer: COMMERCIAL

## 2024-05-30 VITALS
RESPIRATION RATE: 18 BRPM | DIASTOLIC BLOOD PRESSURE: 79 MMHG | SYSTOLIC BLOOD PRESSURE: 114 MMHG | BODY MASS INDEX: 26.98 KG/M2 | HEART RATE: 70 BPM | TEMPERATURE: 98 F | OXYGEN SATURATION: 96 % | WEIGHT: 158.06 LBS | HEIGHT: 64 IN

## 2024-05-30 NOTE — TELEPHONE ENCOUNTER
----- Message from Mel Holcomb PA-C sent at 5/29/2024  5:09 PM CDT -----  Please schedule patient with Dr. Morrison on Monday at 3:00 a.m..  She will also need lumbar CT with robotic protocol prior to her appointment with him.

## 2024-05-31 ENCOUNTER — TELEPHONE (OUTPATIENT)
Dept: NEUROSURGERY | Facility: CLINIC | Age: 34
End: 2024-05-31
Payer: COMMERCIAL

## 2024-06-03 ENCOUNTER — PATIENT MESSAGE (OUTPATIENT)
Dept: NEUROSURGERY | Facility: CLINIC | Age: 34
End: 2024-06-03

## 2024-06-03 ENCOUNTER — OFFICE VISIT (OUTPATIENT)
Dept: NEUROSURGERY | Facility: CLINIC | Age: 34
End: 2024-06-03
Payer: COMMERCIAL

## 2024-06-03 ENCOUNTER — HOSPITAL ENCOUNTER (OUTPATIENT)
Dept: RADIOLOGY | Facility: HOSPITAL | Age: 34
Discharge: HOME | End: 2024-06-03
Attending: HEALTH CARE PROVIDER
Payer: COMMERCIAL

## 2024-06-03 VITALS
HEIGHT: 64 IN | WEIGHT: 156.63 LBS | DIASTOLIC BLOOD PRESSURE: 83 MMHG | SYSTOLIC BLOOD PRESSURE: 128 MMHG | HEART RATE: 95 BPM | BODY MASS INDEX: 26.74 KG/M2

## 2024-06-03 DIAGNOSIS — M54.16 LUMBAR RADICULOPATHY, CHRONIC: ICD-10-CM

## 2024-06-03 DIAGNOSIS — M51.26 LUMBAR DISC HERNIATION: Primary | ICD-10-CM

## 2024-06-03 DIAGNOSIS — Z01.818 PREOP EXAMINATION: ICD-10-CM

## 2024-06-03 DIAGNOSIS — M48.07 SPINAL STENOSIS, LUMBOSACRAL REGION: ICD-10-CM

## 2024-06-03 PROCEDURE — 3074F SYST BP LT 130 MM HG: CPT | Mod: CPTII,S$GLB,, | Performed by: NEUROLOGICAL SURGERY

## 2024-06-03 PROCEDURE — 1159F MED LIST DOCD IN RCRD: CPT | Mod: CPTII,S$GLB,, | Performed by: NEUROLOGICAL SURGERY

## 2024-06-03 PROCEDURE — 3079F DIAST BP 80-89 MM HG: CPT | Mod: CPTII,S$GLB,, | Performed by: NEUROLOGICAL SURGERY

## 2024-06-03 PROCEDURE — 72131 CT LUMBAR SPINE W/O DYE: CPT | Mod: 26,,, | Performed by: RADIOLOGY

## 2024-06-03 PROCEDURE — 99205 OFFICE O/P NEW HI 60 MIN: CPT | Mod: S$GLB,,, | Performed by: NEUROLOGICAL SURGERY

## 2024-06-03 PROCEDURE — 3008F BODY MASS INDEX DOCD: CPT | Mod: CPTII,S$GLB,, | Performed by: NEUROLOGICAL SURGERY

## 2024-06-03 PROCEDURE — 72131 CT LUMBAR SPINE W/O DYE: CPT | Mod: TC

## 2024-06-03 RX ORDER — MUPIROCIN 20 MG/G
OINTMENT TOPICAL
Status: CANCELLED | OUTPATIENT
Start: 2024-06-03

## 2024-06-03 RX ORDER — CEFAZOLIN SODIUM 2 G/50ML
2 SOLUTION INTRAVENOUS
Status: CANCELLED | OUTPATIENT
Start: 2024-06-03

## 2024-06-03 RX ORDER — MUPIROCIN 20 MG/G
1 OINTMENT TOPICAL 2 TIMES DAILY
Status: CANCELLED | OUTPATIENT
Start: 2024-06-03 | End: 2024-06-04

## 2024-06-03 NOTE — PROGRESS NOTES
I appreciate this referral from Dr. Santos    HPI:  This is a very pleasant 34-year-old woman with no significant past medical history who presents with several year history of waxing and waning lumbosacral pain with significant worsening as of 2024.  She also reports new severe left leg pain with weakness.  Following worsening of the low back pain she underwent chiropractic treatment noting the pain somewhat improved for a period of 3 weeks, but then recurred.  She she also endorses severe left leg pain with weakness which began insidiously in May.  She describes stabbing aching pain in the left-greater-than-right lumbosacral region with radiation into the left posterolateral thigh, lateral calf and dorsum of the right foot.  She notes numbness and burning involving the dorsum of the left foot.  She endorses difficulty with left ankle dorsiflexion and walks with a limp.  She states the left ankle weakness began about 2 weeks ago.  She relates that the back and leg pain is equivalent.  She notes that the symptoms are constant and only somewhat alleviated with gabapentin, meloxicam and Norco.  She prefers not to take the narcotic as it causes somnolence.  She underwent interlaminar L4-5 and left transforaminal L5-S1 JOSE with no significant improvement of her symptoms.      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    Smoking status:  Never  Past Medical History:   Diagnosis Date    Mental disorder     anxiety    Thyroid disease       Past Surgical History:   Procedure Laterality Date     SECTION N/A 2020    Procedure:  SECTION;  Surgeon: Placido Eden MD;  Location: [facility] L&D;  Service: OB/GYN;  Laterality: N/A;     SECTION      EPIDURAL STEROID INJECTION INTO LUMBAR SPINE N/A 2024    Procedure:  Injection-steroid-epidural-lumbar;  Surgeon: Liv Santos MD;  Location: [facility] OR;  Service: Anesthesiology;  Laterality: N/A;  ( add on per MD )    TRANSFORAMINAL EPIDURAL INJECTION OF STEROID Left 5/17/2024    Procedure: Injection,steroid,epidural,transforaminal approach;  Surgeon: Liv Santos MD;  Location: [facility] OR;  Service: Anesthesiology;  Laterality: Left;  L5-s1 and s1 TFESI    WISDOM TOOTH EXTRACTION       Social History     Tobacco Use    Smoking status: Never    Smokeless tobacco: Never   Substance Use Topics    Alcohol use: Yes     Comment: occasionally    Drug use: Never       Review of Systems: All systems reviewed and are as above or otherwise negative.    General: well developed, well nourished, no distress.   Head: normocephalic, atraumatic  Eyes: pupils equal, round, reactive to light with accomodation, EOMI.   Neck: trachea midline. No JVD  Cardiovascular: No LE edema   Pulmonary: normal respirations, no signs of respiratory distress  Abdomen: soft, non-distended, not tender to palpation  Sensory: intact to light touch throughout  Extremities: No bruising, deformity  Skin: Skin is warm, dry and intact.    Motor Strength: Moves all extremities spontaneously with good tone.  Full strength upper and lower extremities. No abnormal movements seen.     Strength  Deltoids Triceps Biceps Wrist Extension Wrist Flexion Hand    Upper: R 5/5 5/5 5/5 5/5 5/5 5/5    L 5/5 5/5 5/5 5/5 5/5 5/5     Iliopsoas Quadriceps Knee  Flexion Tibialis  anterior Gastro- cnemius EHL   Lower: R 5/5 5/5 5/5 5/5 5/5 5/5    L 5/5 5/5 5/5 5/5 5/5 5/5     Neurologic: Alert and oriented. Thought content appropriate.  GCS: Motor: 6/Verbal: 5/Eyes: 4 GCS Total: 15  Mental Status: Awake, Alert, Oriented x 4  Language: No aphasia  Speech: No dysarthria  Cranial nerves: face symmetric, tongue midline, CN II-XII grossly intact.     Pronator Drift: no drift noted  Finger-to-nose: Intact bilaterally  DTR's: 2 + and symmetric in UE and  3+ symmetric bilateral LE  Fowler:  Resident  Clonus: absent  Babinski: absent    Gait: normal    Tandem Gait: No difficulty           Able to walk on heels & toes    Cervical Spine  ROM: full    Nontender to palpation    Lumbar Spine   ROM:  Limited in all planes  Tender to palpation    Pain on Hip ROM: Negative  Straight leg raise:  Positive left  SI Joint tenderness: Negative bilaterally   MARYAM: Negative bilaterally    Significant Exam Findings:  Left left EHL and TA graded 3/5.  Straight leg raising positive on the left at 15°.  Antalgic gait on the left.  Lumbosacral spine tender to palpation.  Lumbar range of motion limited in all planes.  Gary sign present right.  Hyperreflexic bilateral lower extremities.    Significant Radiology Findings:  I personally reviewed MRI lumbar spine in detail with the patient.  Date of study 05/11/2024.  Pertinent findings include flat back syndrome with SVA greater than 6 cm.  There is disc degeneration at L4-5 with disc height loss, disc desiccation, facet facet widening at L4-5 and L5-S1.  At L4-5 there is large central disc herniation contributing to severe spinal canal stenosis.  There is encroachment on the traversing right L5 nerve root and obvious displacement of the left L5 nerve root.  At L5-S1 there is a central disc protrusion which abuts the traversing S1 nerve roots without impingement.  There is moderate bilateral foraminal stenosis at L5-S1.  The cephalad lumbar intervertebral discs have well-maintained hydration and height with no other areas of neural element compromise.     Analysis: Given the above findings, including objective left lower extremity weakness, she would benefit from surgical intervention.  I do not advise continued nonoperative management given her neurologic impairment.  I outlined the option left L4-5 laminectomy/ diskectomy versus open decompression and instrumented arthrodesis at L4-5 versus open decompression and instrumented  arthrodesis L4-S1.  I outlined the pros/cons, risks/benefits, and anticipated postoperative course with each surgical management options.  I described the procedure using an  anatomic model and MRI imaging.  I had a lengthy discussion regarding these options in detail with the patient and her sister.  Multiple questions answered.  She is highly motivated to return to her normal activities and avoid repeat procedures due to recurrent disc herniations.  After careful consideration, she elects to proceed with left L4-5 TLIF, right L4-5 laminectomy diskectomy, and bilateral posterior instrumented arthrodesis L4-5 using stereotactic navigation and robotic guidance.  She is aware that she may develop adjacent segment disease at L5-S1 and/or L3-4 which may require additional surgery at these areas.   She was counseled to stop meloxicam and all other NSAIDs 7 days prior to surgery.  CT lumbar with robot protocol and lumbar dynamic x-rays ordered for surgical planning.         [unfilled]   Diagnoses and all orders for this visit:    Lumbar disc herniation  -     Case Request Operating Room: FUSION, SPINE, LUMBAR, TLIF  -     Chlorhexidine (CHG) 2% Wipes; Standing  -     mupirocin 2 % ointment 1 g  -     Chlorohexidine Gluconate Bath; Standing  -     mupirocin 2 % ointment  -     Pulse Oximetry Q4H; Standing  -     Place in Outpatient; Standing  -     Place ARTEM hose; Standing  -     cefazolin (ANCEF) 2 gram in dextrose 5% 50 mL IVPB (premix)  -     CBC Auto Differential; Future  -     BASIC METABOLIC PANEL; Future  -     PROTIME-INR; Future  -     APTT; Future  -     X-Ray Chest 1 View; Future  -     EKG 12-lead; Future  -     TYPE AND SCREEN PREOP; Future  -     Urinalysis  -     CT Lumbar Spine Without Contrast; Future  -     X-Ray Lumbar Complete Including Flex And Ext; Future  -     X-Ray Lumbar Spine AP And Lateral; Future  -     Back/Cervical Brace For Home Use    Spinal stenosis, lumbosacral region  -     CT Lumbar  Spine Without Contrast; Future  -     Back/Cervical Brace For Home Use    Preop examination  -     CBC Auto Differential; Future  -     BASIC METABOLIC PANEL; Future  -     PROTIME-INR; Future  -     APTT; Future  -     X-Ray Chest 1 View; Future  -     EKG 12-lead; Future  -     TYPE AND SCREEN PREOP; Future  -     Urinalysis  -     CT Lumbar Spine Without Contrast; Future  -     X-Ray Lumbar Complete Including Flex And Ext; Future    Other orders  -     IP VTE LOW RISK PATIENT; Standing     I spent a total of 60 minutes on the day of the visit.  This includes face to face time and non-face to face time preparing to see the patient (eg, review of tests), obtaining and/or reviewing separately obtained history, documenting clinical information in the electronic or other health record, independently interpreting results and communicating results to the patient/family/caregiver, or care coordinator.     No follow-ups on file.

## 2024-06-05 ENCOUNTER — OFFICE VISIT (OUTPATIENT)
Dept: NEUROSURGERY | Facility: CLINIC | Age: 34
End: 2024-06-05
Payer: COMMERCIAL

## 2024-06-05 VITALS
HEART RATE: 77 BPM | BODY MASS INDEX: 26.89 KG/M2 | DIASTOLIC BLOOD PRESSURE: 87 MMHG | HEIGHT: 64 IN | SYSTOLIC BLOOD PRESSURE: 121 MMHG

## 2024-06-05 DIAGNOSIS — M51.26 LUMBAR DISC HERNIATION: Primary | ICD-10-CM

## 2024-06-05 DIAGNOSIS — M21.379 FOOT-DROP, UNSPECIFIED LATERALITY: ICD-10-CM

## 2024-06-05 DIAGNOSIS — M51.16 LUMBAR DISC HERNIATION WITH RADICULOPATHY: ICD-10-CM

## 2024-06-05 DIAGNOSIS — M51.36 DDD (DEGENERATIVE DISC DISEASE), LUMBAR: ICD-10-CM

## 2024-06-05 DIAGNOSIS — L70.9 ACNE, UNSPECIFIED ACNE TYPE: Primary | ICD-10-CM

## 2024-06-05 DIAGNOSIS — M51.26 LUMBAR DISC HERNIATION: ICD-10-CM

## 2024-06-05 PROCEDURE — 3008F BODY MASS INDEX DOCD: CPT | Mod: CPTII,S$GLB,, | Performed by: STUDENT IN AN ORGANIZED HEALTH CARE EDUCATION/TRAINING PROGRAM

## 2024-06-05 PROCEDURE — 3074F SYST BP LT 130 MM HG: CPT | Mod: CPTII,S$GLB,, | Performed by: STUDENT IN AN ORGANIZED HEALTH CARE EDUCATION/TRAINING PROGRAM

## 2024-06-05 PROCEDURE — 1159F MED LIST DOCD IN RCRD: CPT | Mod: CPTII,S$GLB,, | Performed by: STUDENT IN AN ORGANIZED HEALTH CARE EDUCATION/TRAINING PROGRAM

## 2024-06-05 PROCEDURE — 99215 OFFICE O/P EST HI 40 MIN: CPT | Mod: S$GLB,,, | Performed by: STUDENT IN AN ORGANIZED HEALTH CARE EDUCATION/TRAINING PROGRAM

## 2024-06-05 PROCEDURE — 3079F DIAST BP 80-89 MM HG: CPT | Mod: CPTII,S$GLB,, | Performed by: STUDENT IN AN ORGANIZED HEALTH CARE EDUCATION/TRAINING PROGRAM

## 2024-06-05 RX ORDER — GABAPENTIN 300 MG/1
300 CAPSULE ORAL 3 TIMES DAILY
Qty: 60 CAPSULE | Refills: 0 | Status: SHIPPED | OUTPATIENT
Start: 2024-06-05 | End: 2024-06-05 | Stop reason: SDUPTHER

## 2024-06-05 RX ORDER — HYDROCODONE BITARTRATE AND ACETAMINOPHEN 10; 325 MG/1; MG/1
1 TABLET ORAL EVERY 8 HOURS PRN
Qty: 15 TABLET | Refills: 0 | Status: ON HOLD | OUTPATIENT
Start: 2024-06-05 | End: 2024-06-11

## 2024-06-05 RX ORDER — CEFAZOLIN SODIUM 2 G/50ML
2 SOLUTION INTRAVENOUS
Status: CANCELLED | OUTPATIENT
Start: 2024-06-05

## 2024-06-05 RX ORDER — SODIUM CHLORIDE, SODIUM LACTATE, POTASSIUM CHLORIDE, CALCIUM CHLORIDE 600; 310; 30; 20 MG/100ML; MG/100ML; MG/100ML; MG/100ML
INJECTION, SOLUTION INTRAVENOUS CONTINUOUS
Status: CANCELLED | OUTPATIENT
Start: 2024-06-05

## 2024-06-05 RX ORDER — LIDOCAINE HYDROCHLORIDE 10 MG/ML
1 INJECTION, SOLUTION EPIDURAL; INFILTRATION; INTRACAUDAL; PERINEURAL ONCE
Status: CANCELLED | OUTPATIENT
Start: 2024-06-05 | End: 2024-06-05

## 2024-06-05 RX ORDER — HYDROCODONE BITARTRATE AND ACETAMINOPHEN 10; 325 MG/1; MG/1
1 TABLET ORAL
COMMUNITY
End: 2024-06-05 | Stop reason: SDUPTHER

## 2024-06-05 RX ORDER — GABAPENTIN 300 MG/1
300 CAPSULE ORAL 3 TIMES DAILY
Qty: 90 CAPSULE | Refills: 2 | Status: SHIPPED | OUTPATIENT
Start: 2024-06-05 | End: 2024-09-03

## 2024-06-05 NOTE — PROGRESS NOTES
[facility] Neurosurgery - [facility]  Clinic Consult     Consult Requested By: Placido Eden MD, Nikolas Witt,*        SUBJECTIVE:     Chief Complaint:       History of Present Illness:  Juana Jean is a 34 y.o. female who presents with Proximally 3 months of back and left lower extremity pain.   Over the last 4-5 weeks she has had more severe left lower extremity pain.  Difficulty ambulating throughout the hospital.  And weakness in her left foot with sensory deficit.   She is attempted activity modification, physical therapy she has had 2 epidural steroid injections at L4-5 and L5-S1 without any relief.   She has had multiple opinions with Dr. Spear and Dr. Morrison.   She is thoroughly counseled by both on indications opportunities levels to treat fusion and nonfusion surgery.   She presents today with persistent symptoms of high severity disability.  Her primarily focuses her left leg pain in her ongoing foot weakness with sensory deficits.   She is managed chronic back pain intermittently here and there.     She has had 3 opinions and considering her options   We had a long discussion.  I agree with her counseling   She is appropriate questions she presents today with her father     She additionally had a CT scan which shows some chronic features but no solidly calcified disc herniation on the left at L4-5              Pertinent and Recent history, provider evaluations, imaging and data reviewed in EPIC       VAS   Back 6     Leg 10      Diagnostic Results:  I have independently reviewed the following imaging:    Impression:     1. L4-5 left paracentral disc extrusion severely narrows the left lateral recess impinging the left L5 nerve root.  2. L5-S1 central disc protrusion mildly narrows the spinal canal.        Electronically signed by:Damian Penny  Date:                                            05/11/2024  Time:                                            15:29    Review of patient's allergies indicates:  No Known Allergies    Past Medical History:   Diagnosis Date    Mental disorder     anxiety    Thyroid disease      Past Surgical History:   Procedure Laterality Date     SECTION N/A 2020    Procedure:  SECTION;  Surgeon: Placido Eden MD;  Location: [facility] L&D;  Service: OB/GYN;  Laterality: N/A;     SECTION      EPIDURAL STEROID INJECTION INTO LUMBAR SPINE N/A 2024    Procedure: Injection-steroid-epidural-lumbar;  Surgeon: Liv Santos MD;  Location: [facility];  Service: Anesthesiology;  Laterality: N/A;  ( add on per MD )    TRANSFORAMINAL EPIDURAL INJECTION OF STEROID Left 2024    Procedure: Injection,steroid,epidural,transforaminal approach;  Surgeon: Liv Santos MD;  Location: [facility] OR;  Service: Anesthesiology;  Laterality: Left;  L5-s1 and s1 TFESI    WISDOM TOOTH EXTRACTION       Family History   Problem Relation Name Age of Onset    Heart disease Mother      Hyperlipidemia Mother      Diabetes Mother      Heart disease Father      Hyperlipidemia Father      Diabetes Sister      Glaucoma Maternal Grandfather       Social History     Tobacco Use    Smoking status: Never    Smokeless tobacco: Never   Substance Use Topics    Alcohol use: Yes     Comment: occasionally    Drug use: Never        Review of Systems:      Constitutional: no fever, chills or night sweats. No abrupt changes in weight       OBJECTIVE:     Vital Signs (Most Recent):  Pulse: 77 (24 1353)  BP: 121/87 (24 1353)    Physical Exam:      General: well developed, well nourished, no distress. .  Mental Status: Awake, Alert, Oriented x 4  Language: No aphasia  Speech: No dysarthria  Head: normocephalic, atraumatic.  Neck: trachea midline, no JVD   Cardiovascular: no LE edema  Pulmonary: normal respirations, no signs of respiratory distress  Abdomen: soft, non-distended    Motor Strength:  No abnormal movements seen.     Strength  Deltoids  Triceps Biceps Wrist Extension Wrist Flexion Hand  Interossei     Upper: R 5/5 5/5 5/5 5/5 5/5 5/5 5/5      L 5/5 5/5 5/5 5/5 5/5 5/5 5/5       Iliopsoas Quadriceps Knee  Flexion Tibialis  anterior Gastro- cnemius EHL  Foot Eversion Foot inversion   Lower: R 5/5 5/5 5/5 5/5 5/5 5/5 5/5 5/5 5/5    L 5/5 5/5 5/5 4-/5 5/5 4/5 5/5 4/5 5/5     SILT,PP dec let pp below knee  Diffuse foot      DTR's: 2 + and symmetric in UE and LE  Achilles 1        Gait: antalgic  Lying on table supine for relief                       Straight leg raise: + left    SI Joint tenderness: Negative bilaterally   MARYAM: Negative bilaterally          ASSESSMENT/PLAN:     Lumbar disc herniation  -     CBC Auto Differential; Future; Expected date: 06/05/2024  -     BASIC METABOLIC PANEL; Future; Expected date: 06/05/2024  -     PROTIME-INR; Future; Expected date: 06/05/2024  -     APTT; Future; Expected date: 06/05/2024  -     TYPE AND SCREEN PREOP; Future; Expected date: 06/05/2024  -     X-Ray Chest PA And Lateral; Future; Expected date: 06/05/2024  -     SCHEDULED EKG 12-LEAD (to Muse); Future  -     X-Ray Lumbar Spine Flexion And Extension Only; Future; Expected date: 06/05/2024    DDD (degenerative disc disease), lumbar    Lumbar disc herniation with radiculopathy    Foot-drop, unspecified laterality         34-year-old female with 3 months of back and leg pain 4-5 weeks of progression with incomplete foot drop with sensory deficit. She is failed conservative treatment   She is a high frequency and severity she is out of work.  She lays supine for relief.    This is her 3rd opinion.  She has been thoroughly counseled by Dr. Spear and by Dr. Morrison.   She is considered her options non fusion, fusion verses levels.   She presents today with her dad this considering options she would like to proceed with a non fusion surgery which would include an L4-5 maximal safe decompression including left-sided microdiskectomy.   I think this is  reasonable there has no heavy calcification on the CT scan    She is counseled on the pros and cons in detail   She will consider options, she  has recently seen Dr. Jane and has been established  with options   We will help however she sees fit      The diagnosis, goals, limitations, risks and benefits of surgery and alternative treatment options where discussed at length (pros/cons). All questions/concerns were addressed. The patient has verbalized a good understanding of the diagnosis, the planned procedure, anticipated post-operative course, overall expectations, and risks including but not limited to: spinal cord or nerve root injury/paralysis, death, nerve injury leading to pain or neurological deficit, csf leak, vascular injury or serious bleeding/need for blood product transfusion/stroke, wrong level surgery, chronic pain/failure to improve or worsening of symptoms, infection, need for further surgery at the same or different levels; medical (eg Heart attack, blood clot, infection) and anesthetic complications.                           Braulio Henning MD  Neurosurgery

## 2024-06-06 ENCOUNTER — PATIENT MESSAGE (OUTPATIENT)
Dept: NEUROSURGERY | Facility: CLINIC | Age: 34
End: 2024-06-06
Payer: COMMERCIAL

## 2024-06-06 DIAGNOSIS — L70.9 ACNE, UNSPECIFIED ACNE TYPE: ICD-10-CM

## 2024-06-06 RX ORDER — TRETINOIN 1 MG/G
CREAM TOPICAL NIGHTLY
Qty: 45 G | Refills: 11 | Status: SHIPPED | OUTPATIENT
Start: 2024-06-06 | End: 2024-06-10 | Stop reason: SDUPTHER

## 2024-06-07 ENCOUNTER — PATIENT MESSAGE (OUTPATIENT)
Dept: NEUROSURGERY | Facility: CLINIC | Age: 34
End: 2024-06-07
Payer: COMMERCIAL

## 2024-06-07 ENCOUNTER — CLINICAL SUPPORT (OUTPATIENT)
Dept: CARDIOLOGY | Facility: CLINIC | Age: 34
End: 2024-06-07
Payer: COMMERCIAL

## 2024-06-07 DIAGNOSIS — Z01.818 PRE-OP EXAMINATION: Primary | ICD-10-CM

## 2024-06-09 LAB
OHS QRS DURATION: 72 MS
OHS QTC CALCULATION: 474 MS

## 2024-06-10 RX ORDER — TRETINOIN 1 MG/G
CREAM TOPICAL NIGHTLY
Qty: 45 G | Refills: 11 | Status: SHIPPED | OUTPATIENT
Start: 2024-06-10

## 2024-06-18 RX ORDER — FLUCONAZOLE 150 MG/1
150 TABLET ORAL DAILY
Qty: 3 TABLET | Refills: 2 | Status: SHIPPED | OUTPATIENT
Start: 2024-06-18

## 2024-06-21 ENCOUNTER — PATIENT MESSAGE (OUTPATIENT)
Dept: NEUROSURGERY | Facility: CLINIC | Age: 34
End: 2024-06-21

## 2024-06-24 ENCOUNTER — PATIENT MESSAGE (OUTPATIENT)
Dept: NEUROSURGERY | Facility: CLINIC | Age: 34
End: 2024-06-24
Payer: COMMERCIAL

## 2024-06-30 PROBLEM — Z98.890 S/P LUMBAR MICRODISCECTOMY: Status: ACTIVE | Noted: 2024-06-30

## 2024-06-30 PROBLEM — M51.26 LUMBAR DISC HERNIATION: Status: ACTIVE | Noted: 2024-06-30

## 2024-07-10 ENCOUNTER — PATIENT MESSAGE (OUTPATIENT)
Dept: NEUROSURGERY | Facility: CLINIC | Age: 34
End: 2024-07-10
Payer: COMMERCIAL

## 2024-07-11 ENCOUNTER — CLINICAL SUPPORT (OUTPATIENT)
Dept: REHABILITATION | Facility: HOSPITAL | Age: 34
End: 2024-07-11
Payer: COMMERCIAL

## 2024-07-11 DIAGNOSIS — Z98.890 S/P LUMBAR MICRODISCECTOMY: ICD-10-CM

## 2024-07-11 DIAGNOSIS — M51.26 LUMBAR DISC HERNIATION: ICD-10-CM

## 2024-07-11 DIAGNOSIS — R29.898 DECREASED STRENGTH OF LOWER EXTREMITY: Primary | ICD-10-CM

## 2024-07-11 PROCEDURE — 97112 NEUROMUSCULAR REEDUCATION: CPT | Mod: PN

## 2024-07-11 PROCEDURE — 97161 PT EVAL LOW COMPLEX 20 MIN: CPT | Mod: PN

## 2024-07-11 RX ORDER — ONDANSETRON 4 MG/1
4 TABLET, ORALLY DISINTEGRATING ORAL EVERY 6 HOURS PRN
Qty: 40 TABLET | Refills: 0 | Status: SHIPPED | OUTPATIENT
Start: 2024-07-11

## 2024-07-11 NOTE — PLAN OF CARE
[facility]  Physical Therapy Initial Evaluation    Date: 7/11/2024   Name: Juana Jean  Clinic Number: [id number]    Therapy Diagnosis:   Encounter Diagnoses   Name Primary?    Lumbar disc herniation     S/P lumbar microdiscectomy     Decreased strength of lower extremity Yes     Physician: Vilma Mariscal PA-C    Physician Orders: PT Eval and Treat   Medical Diagnosis from Referral:   M51.26 (ICD-10-CM) - Lumbar disc herniation   Z98.890 (ICD-10-CM) - S/P lumbar microdiscectomy   Evaluation Date: 7/11/2024  Authorization Period Expiration: 6/19/2025  Plan of Care Expiration: 9/6/2024  Visit # / Visits authorized: 1/1    Time In: 400 pm  Time Out: 435 pm  Total Appointment Time (timed & untimed codes): 35 minutes    Precautions: Standard Note on referral: LEG STRENGTHENING ONLY; NO BACK PT    DATE OF PROCEDURE: 6/11/2024   PROCEDURES PERFORMED:   Procedure(s) (LRB):  LAMINECTOMY, SPINE, MINIMALLY INVASIVE, L4-5 (N/A)  DISCECTOMY, SPINE, MINIMALLY INVASIVE, USING MICROSCOPE, L4-5 (N/A)  Left joann    Subjective   Date of onset: 6/11/2024  History of current condition - Juana reports: she has been having low back pain for a few months now. States she had an insidious onset of back pain that led to LLE pain/numbness and leg weakness. States she began to develop foot drop and then had to get surgery. States she was told she cannot do any back PT for another 2 weeks. States her leg symptoms have improved but does have back pain still. States pain is worse with prolonged sitting/standing/walking. She states her leg pain is isolated to the foot at this point. States she has tingling and numbness at times but it is intermittent. She denies any currently. Easing factors include changes positions. Patient denies any bowel or bladder incontinence. Pt denies any LOB or falls.      Medical History:   Past Medical History:   Diagnosis Date    Encounter for blood transfusion     as an infant     Mental disorder     anxiety    PONV (postoperative nausea and vomiting)     Thyroid disease        Surgical History:   Juana Jean  has a past surgical history that includes [place] tooth extraction;  section (N/A, 2020);  section; Transforaminal epidural injection of steroid (Left, 2024); Epidural steroid injection into lumbar spine (N/A, 2024); LASIK (Bilateral); Laminectomy using minimally invasive technique (N/A, 2024); and Minimally invasive surgical removal of intervertebral disc of spine using microscope (N/A, 2024).    Medications:   Juana has a current medication list which includes the following prescription(s): buspirone, ergocalciferol, fluconazole, fluoxetine, gabapentin, hydrocodone-acetaminophen, levothyroxine, meloxicam, ondansetron, tirzepatide (weight loss), tizanidine, and tretinoin.    Allergies:   Review of patient's allergies indicates:  No Known Allergies     Imaging: none    Prior Therapy: N  Social History: lives with family   Occupation: NP  Prior Level of Function: I  Current Level of Function: I; increased back pain    Pain:  Current: 4/10; Worst: 6/10; Best: 0/10   Location: low back  Description: Aching  Aggravating Factors: Sitting, Standing, Bending, and Walking  Easing Factors: rest    Pt's goals: decrease pain; get stronger  Objective   Observation: calm and cooperative; no brace     Gait:   No AD   Moderate pelvic drop B    SLB:    R: 10s; Eyes open and closed   L: 10s; Eyes opened; unable eyes closed    Posture:     Flattened lumbar lordosis     Dermatomes Right Left Comments   L2 Intact Intact     L3 Intact Intact     L4 Intact Intact     L5 Intact Intact     S1 Intact Intact     S2 Intact Intact                   Myotomes Right Left Comments   L2 5/5 4/5     L3 5/5 4/5     L4 5/5 4-/5     L5 5/5 3-/5 Great toe    S1 5/5 5/5     S2 5/5 5/5        Lumbar Range of Motion: not formally tested due to referral  instructions    Hip Passive Range of Motion:    Right  Left  Normal   Flexion 100 95 120 degrees    Extension 15 15 20 degrees    Ext. Rotation 40 40 45-60 degrees   Int. Rotation 35 35 35 degrees          Lower Extremity Strength  Right LE   Left LE                       Hip extension: seated 4/5 Hip extension: seated 4/5   Hip abduction:  4/5 Hip abduction:  4-/5     Special Test: Slump + on L for back pain      Joint Mobility:   Lumbar: not tested  Thoracic: not tested  Hip: hypomobile    Sensation: WNL        Limitation/Restriction for FOTO Lumbar Survey    Therapist reviewed FOTO scores for Juana Jean on 7/11/2024.   FOTO documents entered into [employer] - see Media section.    Score: 36%  Predicted Score: 64%         TREATMENT   Treatment Time In: 425 pm  Treatment Time Out: 435 pm  Total Treatment time (time-based codes) separate from Evaluation: 10 minutes    Juana received neuromuscular re-education to develop sense, proprioception, and coordination for 10 minutes:    Seated Slump Nerve Glides; neck and knee ext only 2 x 15  Seated Toe Yoga 2minutes  Supine BKFO x 20 B  Education - HEP / POC / avoid BLT     Home Exercises and Patient Education Provided    Education provided:   - HEP  - Prognosis/POC    Written Home Exercises Provided: yes.  Exercises were reviewed and Juana was able to demonstrate them prior to the end of the session.  Juana demonstrated good  understanding of the education provided.     See EMR under Patient Instructions for exercises provided 7/11/2024.    Assessment   Juana is a 34 y.o. female referred to outpatient Physical Therapy with a medical diagnosis of S/P lumbar microdiscectomy and lumbar disc herniation. Pt presents with painful and limited lumbar AROM, LE weakness, neural mechanosensitivity, abnormal posture, and functional limitations of difficulty walking or sitting/standing prolonged distance/time due to pain. Patient would benefit from skilled PT to  address these deficits and facilitate a return to PLOF. Pt agreeable 1x/week for first 2 weeks until clearance for spine PT.     Pt to be seen 1-2x/week for 8x weeks     Pt prognosis is Good.   Pt will benefit from skilled outpatient Physical Therapy to address the deficits stated above and in the chart below, provide pt/family education, and to maximize pt's level of independence.     Plan of care discussed with patient: Yes  Pt's spiritual, cultural and educational needs considered and patient is agreeable to the plan of care and goals as stated below:     Anticipated Barriers for therapy: none    Medical Necessity is demonstrated by the following  History  Co-morbidities and personal factors that may impact the plan of care Co-morbidities:   anxiety and thyroid disorder    Personal Factors:   no deficits     low   Examination  Body Structures and Functions, activity limitations and participation restrictions that may impact the plan of care Body Regions:   back  lower extremities  trunk    Body Systems:    gross symmetry  ROM  strength  gross coordinated movement  balance  gait  transfers  motor control    Participation Restrictions:   none    Activity limitations:   no deficits    General Tasks and Commands  no deficits    Communication  no deficits    Mobility  lifting and carrying objects  walking  driving (bike, car, motorcycle)    Self care  no deficits    Domestic Life  cooking  doing house work (cleaning house, washing dishes, laundry)  assisting others    Interactions/Relationships  no deficits    Life Areas  no deficits    Community and Social Life  community life  recreation and leisure         low   Clinical Presentation stable and uncomplicated low   Decision Making/ Complexity Score: low     Goals:  Short Term Goals: 4 weeks  1. Patient will be independent with HEP in order to supplement pain free lumbar ROM - PROGRESSING, NOT MET  2. Pt will hip ROM to at least 110 degrees to reduce lumbar loading  in sitting position - PROGRESSING, NOT MET  3. Patient will improve Slump to (-) to demonstrate improved neural mobility- PROGRESSING, NOT MET     Long Term Goals: 8 weeks   1. Pt will improve lumbar FOTO survey to </= predicted% limited in order to return to ADLs without limitation - PROGRESSING, NOT MET  2. Patient will improve LE strength by 1 full grade bilaterally for improved trunk support. - PROGRESSING, NOT MET  3. Pt will report no pain during lumbar AROM/ADL's in order to promote functional mobility - PROGRESSING, NOT MET    Plan   Plan of care Certification: 7/11/2024 to 9/6/2024.    Outpatient Physical Therapy 1 times weekly for 8 weeks to include the following interventions: Gait Training, Manual Therapy, Moist Heat/ Ice, Neuromuscular Re-ed, Patient Education, Self Care, Therapeutic Activities, and Therapeutic Exercise.     Jose Hernandez, PT

## 2024-07-18 ENCOUNTER — CLINICAL SUPPORT (OUTPATIENT)
Dept: REHABILITATION | Facility: HOSPITAL | Age: 34
End: 2024-07-18
Payer: COMMERCIAL

## 2024-07-18 DIAGNOSIS — R29.898 DECREASED STRENGTH OF LOWER EXTREMITY: Primary | ICD-10-CM

## 2024-07-18 PROCEDURE — 97112 NEUROMUSCULAR REEDUCATION: CPT | Mod: PN

## 2024-07-18 PROCEDURE — 97110 THERAPEUTIC EXERCISES: CPT | Mod: PN

## 2024-07-18 PROCEDURE — 97530 THERAPEUTIC ACTIVITIES: CPT | Mod: PN

## 2024-07-18 NOTE — PROGRESS NOTES
Physical Therapy Treatment Note     Name: Juana Jean  Clinic Number: [id number]    Therapy Diagnosis:   Encounter Diagnosis   Name Primary?    Decreased strength of lower extremity Yes     Physician: Vilma Mariscal PA-C    Visit Date: 7/18/2024    Physician Orders: PT Eval and Treat   Medical Diagnosis from Referral:   M51.26 (ICD-10-CM) - Lumbar disc herniation   Z98.890 (ICD-10-CM) - S/P lumbar microdiscectomy   Evaluation Date: 7/11/2024  Authorization Period Expiration: 12/31/2024  Plan of Care Expiration: 9/6/2024  Visit # / Visits authorized: 1/20     Time In: 400 pm  Time Out: 453 pm  Total Appointment Time (timed & untimed codes): 53 minutes     Precautions: Standard Note on referral: LEG STRENGTHENING ONLY; NO BACK PT     DATE OF PROCEDURE: 6/11/2024   PROCEDURES PERFORMED:   Procedure(s) (LRB):  LAMINECTOMY, SPINE, MINIMALLY INVASIVE, L4-5 (N/A)  DISCECTOMY, SPINE, MINIMALLY INVASIVE, USING MICROSCOPE, L4-5 (N/A)  Left joann    Subjective     Pt reports: she was a little sore after last visit. States she has been doing her exercises. Denies any leg pain.     She was compliant with home exercise program.  Response to previous treatment: felt fine  Functional change: ongoing    Pain: 0/10  Location: bilateral back      Objective     Juana received therapeutic exercises to develop strength and endurance for 15 minutes including:    Recumbent Bike 8 minutes; Level 2   Seated LAQ 10# 3 x 10 B   Education - HEP     Juana received neuromuscular re-education to develop sense, proprioception, and coordination for 24 minutes:     Seated Slump Nerve Glides; neck and knee ext only 2 x 15  Seated Toe Yoga 2minutes  Seated Toe curl + DF x 25   Seated Toe splay 3 minutes  Supine BKFO x 20 B RTB  SL Hip Abd off wedge 3 x 8 B   Standing Hip Extension leaning on mat 2#   DL Heel Raises 3s eccentric; 3 x 10     Juana participated in dynamic functional therapeutic activities to improve functional performance  for 8  minutes, including:    DL Shuttle 50# 3 x 10   SL Shuttle 25# 3 x 8         Home Exercises Provided and Patient Education Provided     Education provided:   - HEP    Written Home Exercises Provided: yes.  Exercises were reviewed and Juana was able to demonstrate them prior to the end of the session.  Juana demonstrated good  understanding of the education provided.     See EMR under Patient Instructions for exercises provided 7/18/2024.    Assessment     Juana tolerated treatment well today. Was able to perform all exercises without pain except for last few minutes on the bike. Added SL hip abd and seated DF+toe ext to HEP.     Juana Is progressing well towards her goals.   Pt prognosis is Good.     Pt will continue to benefit from skilled outpatient physical therapy to address the deficits listed in the problem list box on initial evaluation, provide pt/family education and to maximize pt's level of independence in the home and community environment.     Pt's spiritual, cultural and educational needs considered and pt agreeable to plan of care and goals.     Anticipated barriers to physical therapy: none    Goals:  Short Term Goals: 4 weeks  1. Patient will be independent with HEP in order to supplement pain free lumbar ROM - PROGRESSING, NOT MET  2. Pt will hip ROM to at least 110 degrees to reduce lumbar loading in sitting position - PROGRESSING, NOT MET  3. Patient will improve Slump to (-) to demonstrate improved neural mobility- PROGRESSING, NOT MET     Long Term Goals: 8 weeks   1. Pt will improve lumbar FOTO survey to </= predicted% limited in order to return to ADLs without limitation - PROGRESSING, NOT MET  2. Patient will improve LE strength by 1 full grade bilaterally for improved trunk support. - PROGRESSING, NOT MET  3. Pt will report no pain during lumbar AROM/ADL's in order to promote functional mobility - PROGRESSING, NOT MET     Plan   Plan of care Certification: 7/11/2024 to  9/6/2024.    Jose Hernandez, PT , DPT, OCS

## 2024-07-24 NOTE — PROGRESS NOTES
Physical Therapy Treatment Note     Name: Juana Jean  Clinic Number: [id number]    Therapy Diagnosis:   Encounter Diagnosis   Name Primary?    Decreased strength of lower extremity Yes       Physician: Vilma Mariscal PA-C    Visit Date: 7/25/2024    Physician Orders: PT Eval and Treat   Medical Diagnosis from Referral:   M51.26 (ICD-10-CM) - Lumbar disc herniation   Z98.890 (ICD-10-CM) - S/P lumbar microdiscectomy   Evaluation Date: 7/11/2024  Authorization Period Expiration: 12/31/2024  Plan of Care Expiration: 9/6/2024  Visit # / Visits authorized: 2/20     Time In: 350 pm  Time Out: 444 pm  Total Appointment Time (timed & untimed codes): 54 minutes     Precautions: Standard Note on referral: LEG STRENGTHENING ONLY; NO BACK PT     DATE OF PROCEDURE: 6/11/2024   PROCEDURES PERFORMED:   Procedure(s) (LRB):  LAMINECTOMY, SPINE, MINIMALLY INVASIVE, L4-5 (N/A)  DISCECTOMY, SPINE, MINIMALLY INVASIVE, USING MICROSCOPE, L4-5 (N/A)  Left joann    Subjective     Pt reports: she has been feeling great; no issues. Saw doctor and is cleared to work on low back with PT.     She was compliant with home exercise program.  Response to previous treatment: felt fine  Functional change: ongoing    Pain: 0/10  Location: bilateral back      Objective     Juana received therapeutic exercises to develop strength and endurance for 8 minutes including:    Recumbent Bike 8 minutes; Level 2   Seated LAQ 10# 3 x 10 B - not today  Education - HEP     Juana received neuromuscular re-education to develop sense, proprioception, and coordination for 23 minutes:     Seated Toe Yoga 3 minutes  Supine March 2 x 10 B  Supine BKFO x 20 B RTB  SL Hip Abd 3 x 10 B   Standing Hip Extension leaning on mat 4#   Quadruped Alt UE reach 4 x 8 B  Paloff Press 3 x 10    Juana participated in dynamic functional therapeutic activities to improve functional performance for 23  minutes, including:    Education on lifting mechanics  Side Steps YTB 2  x 10 yards  DL Squat at 24inch box holding 5# DB at chest height # 3 x 10   SL Shuttle 37# 3 x 8       Home Exercises Provided and Patient Education Provided     Education provided:   - HEP    Written Home Exercises Provided: yes.  Exercises were reviewed and Juana was able to demonstrate them prior to the end of the session.  Juana demonstrated good  understanding of the education provided.     See EMR under Patient Instructions for exercises provided 7/18/2024.    Assessment     Juana tolerated treatment well today. No pain or discomfort reported within session. Paloff press added to HEP. Blue TB provided. Will continue to progress as tolerated.      Juana Is progressing well towards her goals.   Pt prognosis is Good.     Pt will continue to benefit from skilled outpatient physical therapy to address the deficits listed in the problem list box on initial evaluation, provide pt/family education and to maximize pt's level of independence in the home and community environment.     Pt's spiritual, cultural and educational needs considered and pt agreeable to plan of care and goals.     Anticipated barriers to physical therapy: none    Goals:  Short Term Goals: 4 weeks  1. Patient will be independent with HEP in order to supplement pain free lumbar ROM - PROGRESSING, NOT MET  2. Pt will hip ROM to at least 110 degrees to reduce lumbar loading in sitting position - PROGRESSING, NOT MET  3. Patient will improve Slump to (-) to demonstrate improved neural mobility- PROGRESSING, NOT MET     Long Term Goals: 8 weeks   1. Pt will improve lumbar FOTO survey to </= predicted% limited in order to return to ADLs without limitation - PROGRESSING, NOT MET  2. Patient will improve LE strength by 1 full grade bilaterally for improved trunk support. - PROGRESSING, NOT MET  3. Pt will report no pain during lumbar AROM/ADL's in order to promote functional mobility - PROGRESSING, NOT MET     Plan   Plan of care  Certification: 7/11/2024 to 9/6/2024.    Jose Hernandez, PT , DPT, OCS

## 2024-07-25 ENCOUNTER — OFFICE VISIT (OUTPATIENT)
Dept: NEUROSURGERY | Facility: CLINIC | Age: 34
End: 2024-07-25
Payer: COMMERCIAL

## 2024-07-25 ENCOUNTER — CLINICAL SUPPORT (OUTPATIENT)
Dept: REHABILITATION | Facility: HOSPITAL | Age: 34
End: 2024-07-25
Payer: COMMERCIAL

## 2024-07-25 VITALS
SYSTOLIC BLOOD PRESSURE: 104 MMHG | HEART RATE: 81 BPM | RESPIRATION RATE: 18 BRPM | WEIGHT: 155 LBS | HEIGHT: 64 IN | DIASTOLIC BLOOD PRESSURE: 73 MMHG | BODY MASS INDEX: 26.46 KG/M2

## 2024-07-25 DIAGNOSIS — Z98.890 S/P LUMBAR MICRODISCECTOMY: ICD-10-CM

## 2024-07-25 DIAGNOSIS — M51.26 LUMBAR DISC HERNIATION: Primary | ICD-10-CM

## 2024-07-25 DIAGNOSIS — R29.898 DECREASED STRENGTH OF LOWER EXTREMITY: Primary | ICD-10-CM

## 2024-07-25 PROCEDURE — 3078F DIAST BP <80 MM HG: CPT | Mod: CPTII,S$GLB,, | Performed by: STUDENT IN AN ORGANIZED HEALTH CARE EDUCATION/TRAINING PROGRAM

## 2024-07-25 PROCEDURE — 97530 THERAPEUTIC ACTIVITIES: CPT | Mod: PN

## 2024-07-25 PROCEDURE — 3074F SYST BP LT 130 MM HG: CPT | Mod: CPTII,S$GLB,, | Performed by: STUDENT IN AN ORGANIZED HEALTH CARE EDUCATION/TRAINING PROGRAM

## 2024-07-25 PROCEDURE — 97110 THERAPEUTIC EXERCISES: CPT | Mod: PN

## 2024-07-25 PROCEDURE — 1159F MED LIST DOCD IN RCRD: CPT | Mod: CPTII,S$GLB,, | Performed by: STUDENT IN AN ORGANIZED HEALTH CARE EDUCATION/TRAINING PROGRAM

## 2024-07-25 PROCEDURE — 99024 POSTOP FOLLOW-UP VISIT: CPT | Mod: S$GLB,,, | Performed by: STUDENT IN AN ORGANIZED HEALTH CARE EDUCATION/TRAINING PROGRAM

## 2024-07-25 PROCEDURE — 97112 NEUROMUSCULAR REEDUCATION: CPT | Mod: PN

## 2024-07-25 NOTE — PROGRESS NOTES
"REFERRING PHYSICIAN:    No ref. 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 significantly improving    MEDICATIONS:                   List reviewed, see chart for dosing details.    ALLERGIES:       Review of patient's allergies indicates:  No Known Allergies    PHYSICAL EXAMINATION:          VITAL SIGNS:   /73 (BP Location: Right arm, Patient Position: Sitting)   Pulse 81   Resp 18   Ht 5' 4" (1.626 m)   Wt 70.3 kg (154 lb 15.7 oz)   BMI 26.60 kg/m²     GENERAL:  Patient is well developed, well nourished, calm, collected, and in no apparent distress.  Incision is healed    NEUROLOGICAL:      Motor Strength:  No abnormal movements seen.      Strength   Deltoids Triceps Biceps Wrist Extension Wrist Flexion Hand  Interossei       Upper: R 5/5 5/5 5/5 5/5 5/5 5/5 5/5         L 5/5 5/5 5/5 5/5 5/5 5/5 5/5           Iliopsoas Quadriceps Knee  Flexion Tibialis  anterior Gastro- cnemius EHL   Foot Eversion Foot inversion   Lower: R 5/5 5/5 5/5 5/5 5/5 5/5 5/5 5/5 5/5     L 5/5 5/5 5/5 4+/5 5/5 4/5 5/5 4/5 5/5          Gait is normal              Failed to redirect to the Timeline version of the [facility].      ASSESSMENT/PLAN:  Six weeks postop  Doing great she was highly disabled preoperative  She now has a normal gait despite some residual foot weakness which she feels is improving.  She will continue work with physical therapy  Or sensory deficits painful radiculopathy have improved  She has done isotonic lumbar physical therapy work on her gait and foot.  She will progress with low-impact range of motion  We will have a three-month follow up      Advised to call the office Iif any questions or concerns arise.    This note was partially " dictated using voice recognition software, so please excuse any errors that were not corrected.

## 2024-07-30 ENCOUNTER — CLINICAL SUPPORT (OUTPATIENT)
Dept: REHABILITATION | Facility: HOSPITAL | Age: 34
End: 2024-07-30
Payer: COMMERCIAL

## 2024-07-30 DIAGNOSIS — R29.898 DECREASED STRENGTH OF LOWER EXTREMITY: Primary | ICD-10-CM

## 2024-07-30 PROCEDURE — 97110 THERAPEUTIC EXERCISES: CPT | Mod: PN

## 2024-07-30 PROCEDURE — 97530 THERAPEUTIC ACTIVITIES: CPT | Mod: PN

## 2024-07-30 PROCEDURE — 97112 NEUROMUSCULAR REEDUCATION: CPT | Mod: PN

## 2024-07-30 NOTE — PROGRESS NOTES
Physical Therapy Treatment Note     Name: Juana Jean  Clinic Number: [id number]    Therapy Diagnosis:   Encounter Diagnosis   Name Primary?    Decreased strength of lower extremity Yes     Physician: Vilma Mariscal PA-C    Visit Date: 7/30/2024    Physician Orders: PT Eval and Treat   Medical Diagnosis from Referral:   M51.26 (ICD-10-CM) - Lumbar disc herniation   Z98.890 (ICD-10-CM) - S/P lumbar microdiscectomy   Evaluation Date: 7/11/2024  Authorization Period Expiration: 12/31/2024  Plan of Care Expiration: 9/6/2024  Visit # / Visits authorized: 3/20     Time In: 350 pm  Time Out: 445 pm  Total Appointment Time (timed & untimed codes): 55 minutes     Precautions: Standard      DATE OF PROCEDURE: 6/11/2024   PROCEDURES PERFORMED:   Procedure(s) (LRB):  LAMINECTOMY, SPINE, MINIMALLY INVASIVE, L4-5 (N/A)  DISCECTOMY, SPINE, MINIMALLY INVASIVE, USING MICROSCOPE, L4-5 (N/A)  Left joann    Subjective     Pt reports: she has been feeling great with her first day back in the clinic but states she had some soreness last night. States she has to sit to stand a lot.     She was compliant with home exercise program.  Response to previous treatment: felt fine  Functional change: ongoing    Pain: 0/10  Location: bilateral back      Objective     Juana received therapeutic exercises to develop strength and endurance for 8 minutes including:    Standing Lumbar Ext 50% range 2 x 10 pain-free  Seated thoracic ext x 20   Education - HEP     Juana received neuromuscular re-education to develop sense, proprioception, and coordination for 23 minutes:     Seated Toe Yoga 3 minutes  Supine March 2 x 10 BTB B  Supine BKFO x 20 B BTB  SL Clams BTB 3 x 10 B    Quadruped Alt UE reach 3 x 8 B  Quadruped Alt LE reach 3 x 8 B  Quadruped Alt opp UE/LE reach 3 x 8 B    Juana participated in dynamic functional therapeutic activities to improve functional performance for 24 minutes, including:    Education on lifting  mechanics  Side Steps YTB 2 x 10 yards  DL Squat at 24inch box holding 5# DB at chest height # 3 x 10   SL Shuttle 50# 3 x 8   Paloff Press with mini squat 3 x 8 B      Home Exercises Provided and Patient Education Provided     Education provided:   - HEP    Written Home Exercises Provided: yes.  Exercises were reviewed and Juana was able to demonstrate them prior to the end of the session.  Juana demonstrated good  understanding of the education provided.     See EMR under Patient Instructions for exercises provided 7/18/2024.    Assessment     Juana tolerated treatment well today. Slight increase in LBP with paloff mini squats. Advised her to perform 50% range lumbar ext standing as well as seated thoracic ext to offset time sitting in lumbar flexion with hope of decreasing disc pressure. Pt demonstrated good understanding of those exericses.     Juana Is progressing well towards her goals.   Pt prognosis is Good.     Pt will continue to benefit from skilled outpatient physical therapy to address the deficits listed in the problem list box on initial evaluation, provide pt/family education and to maximize pt's level of independence in the home and community environment.     Pt's spiritual, cultural and educational needs considered and pt agreeable to plan of care and goals.     Anticipated barriers to physical therapy: none    Goals:  Short Term Goals: 4 weeks  1. Patient will be independent with HEP in order to supplement pain free lumbar ROM - PROGRESSING, NOT MET  2. Pt will hip ROM to at least 110 degrees to reduce lumbar loading in sitting position - PROGRESSING, NOT MET  3. Patient will improve Slump to (-) to demonstrate improved neural mobility- PROGRESSING, NOT MET     Long Term Goals: 8 weeks   1. Pt will improve lumbar FOTO survey to </= predicted% limited in order to return to ADLs without limitation - PROGRESSING, NOT MET  2. Patient will improve LE strength by 1 full grade bilaterally for  improved trunk support. - PROGRESSING, NOT MET  3. Pt will report no pain during lumbar AROM/ADL's in order to promote functional mobility - PROGRESSING, NOT MET     Plan   Plan of care Certification: 7/11/2024 to 9/6/2024.    Jose Hernandez, PT , DPT, OCS

## 2024-08-06 RX ORDER — SILVER SULFADIAZINE 10 G/1000G
1 CREAM TOPICAL 2 TIMES DAILY
Qty: 50 G | Refills: 1 | Status: SHIPPED | OUTPATIENT
Start: 2024-08-06

## 2024-08-13 ENCOUNTER — CLINICAL SUPPORT (OUTPATIENT)
Dept: REHABILITATION | Facility: HOSPITAL | Age: 34
End: 2024-08-13
Payer: COMMERCIAL

## 2024-08-13 DIAGNOSIS — R29.898 DECREASED STRENGTH OF LOWER EXTREMITY: Primary | ICD-10-CM

## 2024-08-13 PROCEDURE — 97110 THERAPEUTIC EXERCISES: CPT | Mod: PN

## 2024-08-13 PROCEDURE — 97530 THERAPEUTIC ACTIVITIES: CPT | Mod: PN

## 2024-08-13 PROCEDURE — 97112 NEUROMUSCULAR REEDUCATION: CPT | Mod: PN

## 2024-08-13 NOTE — PROGRESS NOTES
Physical Therapy Treatment Note     Name: Juana Jean  Clinic Number: [id number]    Therapy Diagnosis:   Encounter Diagnosis   Name Primary?    Decreased strength of lower extremity Yes       Physician: Vilma Mariscal PA-C    Visit Date: 8/13/2024    Physician Orders: PT Eval and Treat   Medical Diagnosis from Referral:   M51.26 (ICD-10-CM) - Lumbar disc herniation   Z98.890 (ICD-10-CM) - S/P lumbar microdiscectomy   Evaluation Date: 7/11/2024  Authorization Period Expiration: 12/31/2024  Plan of Care Expiration: 9/6/2024  Visit # / Visits authorized: 4/20    Time In: 353 pm  Time Out: 447 pm  Total Appointment Time (timed & untimed codes): 54 minutes     Precautions: Standard      DATE OF PROCEDURE: 6/11/2024   PROCEDURES PERFORMED:   Procedure(s) (LRB):  LAMINECTOMY, SPINE, MINIMALLY INVASIVE, L4-5 (N/A)  DISCECTOMY, SPINE, MINIMALLY INVASIVE, USING MICROSCOPE, L4-5 (N/A)  Left joann    Subjective     Pt reports: she has had some increase in pain after sleeping in a chair at the hospital. States the pain is better now but in the low back and L buttock. States it is the sitting and standing that is increasing her pain.     She was compliant with home exercise program.  Response to previous treatment: felt fine  Functional change: ongoing    Pain: 0/10  Location: bilateral back      Objective     Slump Test: (+), improved following MT and thoracic mobility    Juana received therapeutic exercises to develop strength and endurance for 23 minutes including:    Assessment above  SL Open Books x 15   HHR x 20  Seated thoracic ext x 20   Education - HEP     Juana received neuromuscular re-education to develop sense, proprioception, and coordination for 8 minutes:       Supine BKFO x 20 B BTB  SL Clams BTB 3 x 10 B      NT:   Quadruped Alt UE reach 3 x 8 B  Quadruped Alt LE reach 3 x 8 B  Quadruped Alt opp UE/LE reach 3 x 8 B    Juana participated in dynamic functional therapeutic activities to improve  functional performance for 23 minutes, including:    Education on lifting mechanics/ nerve mobility  Seated Slump Nerve glides 2 x 15   Recumbent Bike 5 minutes  Standing Paloff Press with Hip Hinge 2 x 10 B    Home Exercises Provided and Patient Education Provided     Education provided:   - HEP    Written Home Exercises Provided: yes.  Exercises were reviewed and Juana was able to demonstrate them prior to the end of the session.  Juana demonstrated good  understanding of the education provided.     See EMR under Patient Instructions for exercises provided 7/18/2024.    Assessment     Juana tolerated treatment well today. Slight increase in LBP with paloff mini squats. Advised her to perform 50% range lumbar ext standing as well as seated thoracic ext to offset time sitting in lumbar flexion with hope of decreasing disc pressure. Pt demonstrated good understanding of those exericses.     Juana Is progressing well towards her goals.   Pt prognosis is Good.     Pt will continue to benefit from skilled outpatient physical therapy to address the deficits listed in the problem list box on initial evaluation, provide pt/family education and to maximize pt's level of independence in the home and community environment.     Pt's spiritual, cultural and educational needs considered and pt agreeable to plan of care and goals.     Anticipated barriers to physical therapy: none    Goals:  Short Term Goals: 4 weeks  1. Patient will be independent with HEP in order to supplement pain free lumbar ROM - PROGRESSING, NOT MET  2. Pt will hip ROM to at least 110 degrees to reduce lumbar loading in sitting position - PROGRESSING, NOT MET  3. Patient will improve Slump to (-) to demonstrate improved neural mobility- PROGRESSING, NOT MET     Long Term Goals: 8 weeks   1. Pt will improve lumbar FOTO survey to </= predicted% limited in order to return to ADLs without limitation - PROGRESSING, NOT MET  2. Patient will improve  LE strength by 1 full grade bilaterally for improved trunk support. - PROGRESSING, NOT MET  3. Pt will report no pain during lumbar AROM/ADL's in order to promote functional mobility - PROGRESSING, NOT MET     Plan   Plan of care Certification: 7/11/2024 to 9/6/2024.    Jose Hernandez, PT , DPT, OCS

## 2024-08-27 ENCOUNTER — LAB VISIT (OUTPATIENT)
Dept: LAB | Facility: HOSPITAL | Age: 34
End: 2024-08-27
Attending: NURSE PRACTITIONER
Payer: COMMERCIAL

## 2024-08-27 DIAGNOSIS — H02.403 ACQUIRED INVOLUTIONAL PTOSIS OF EYELID, BILATERAL: Primary | ICD-10-CM

## 2024-08-27 DIAGNOSIS — E03.9 HYPOTHYROIDISM, UNSPECIFIED TYPE: ICD-10-CM

## 2024-08-27 LAB
T4 FREE SERPL-MCNC: 0.98 NG/DL (ref 0.71–1.51)
TSH SERPL DL<=0.005 MIU/L-ACNC: 2.15 UIU/ML (ref 0.34–5.6)

## 2024-08-27 PROCEDURE — 84439 ASSAY OF FREE THYROXINE: CPT | Performed by: NURSE PRACTITIONER

## 2024-08-27 PROCEDURE — 84443 ASSAY THYROID STIM HORMONE: CPT | Performed by: NURSE PRACTITIONER

## 2024-08-27 PROCEDURE — 84481 FREE ASSAY (FT-3): CPT | Performed by: NURSE PRACTITIONER

## 2024-08-29 LAB — T3FREE SERPL-MCNC: 2.3 PG/ML (ref 2–4.4)

## 2024-09-10 RX ORDER — METHYLPREDNISOLONE 4 MG/1
TABLET ORAL
Qty: 21 EACH | Refills: 0 | Status: SHIPPED | OUTPATIENT
Start: 2024-09-10 | End: 2024-10-02

## 2024-09-10 RX ORDER — AZITHROMYCIN 250 MG/1
TABLET, FILM COATED ORAL
Qty: 6 TABLET | Refills: 0 | Status: SHIPPED | OUTPATIENT
Start: 2024-09-10 | End: 2024-09-16

## 2024-10-16 RX ORDER — BUSPIRONE HYDROCHLORIDE 10 MG/1
10 TABLET ORAL 3 TIMES DAILY PRN
Qty: 90 TABLET | Refills: 3 | Status: SHIPPED | OUTPATIENT
Start: 2024-10-16 | End: 2025-10-16

## 2024-10-21 ENCOUNTER — TELEPHONE (OUTPATIENT)
Dept: OBSTETRICS AND GYNECOLOGY | Facility: CLINIC | Age: 34
End: 2024-10-21
Payer: COMMERCIAL

## 2024-10-23 RX ORDER — LEVOTHYROXINE SODIUM 150 UG/1
TABLET ORAL
Qty: 90 TABLET | Refills: 3 | Status: SHIPPED | OUTPATIENT
Start: 2024-10-23

## 2024-10-26 RX ORDER — FLUCONAZOLE 150 MG/1
150 TABLET ORAL DAILY
Qty: 3 TABLET | Refills: 0 | Status: SHIPPED | OUTPATIENT
Start: 2024-10-26 | End: 2024-10-29

## 2024-11-07 RX ORDER — VALACYCLOVIR HYDROCHLORIDE 500 MG/1
500 TABLET, FILM COATED ORAL DAILY
Qty: 90 TABLET | Refills: 0 | Status: SHIPPED | OUTPATIENT
Start: 2024-11-07 | End: 2025-02-05

## 2024-11-12 ENCOUNTER — PATIENT MESSAGE (OUTPATIENT)
Dept: OPHTHALMOLOGY | Facility: CLINIC | Age: 34
End: 2024-11-12
Payer: COMMERCIAL

## 2024-11-14 RX ORDER — METHYLPREDNISOLONE 4 MG/1
TABLET ORAL
Qty: 21 EACH | Refills: 0 | Status: SHIPPED | OUTPATIENT
Start: 2024-11-14 | End: 2024-12-05

## 2024-11-14 RX ORDER — AZITHROMYCIN 250 MG/1
TABLET, FILM COATED ORAL
Qty: 6 TABLET | Refills: 0 | Status: SHIPPED | OUTPATIENT
Start: 2024-11-14 | End: 2024-11-19

## 2024-11-18 DIAGNOSIS — L30.9 ECZEMA, UNSPECIFIED TYPE: Primary | ICD-10-CM

## 2024-11-18 RX ORDER — TRIAMCINOLONE ACETONIDE 1 MG/G
1 OINTMENT TOPICAL 2 TIMES DAILY
Qty: 454 G | Refills: 1 | Status: SHIPPED | OUTPATIENT
Start: 2024-11-18

## 2024-11-19 RX ORDER — CETIRIZINE HYDROCHLORIDE 10 MG/1
10 TABLET ORAL DAILY
Qty: 30 TABLET | Refills: 3 | Status: SHIPPED | OUTPATIENT
Start: 2024-11-19 | End: 2025-11-19

## 2024-12-02 ENCOUNTER — PATIENT OUTREACH (OUTPATIENT)
Dept: ADMINISTRATIVE | Facility: HOSPITAL | Age: 34
End: 2024-12-02
Payer: COMMERCIAL

## 2024-12-02 ENCOUNTER — OFFICE VISIT (OUTPATIENT)
Dept: OBSTETRICS AND GYNECOLOGY | Facility: CLINIC | Age: 34
End: 2024-12-02
Payer: COMMERCIAL

## 2024-12-02 VITALS
HEART RATE: 77 BPM | WEIGHT: 142.44 LBS | DIASTOLIC BLOOD PRESSURE: 76 MMHG | HEIGHT: 64 IN | OXYGEN SATURATION: 99 % | BODY MASS INDEX: 24.32 KG/M2 | SYSTOLIC BLOOD PRESSURE: 102 MMHG

## 2024-12-02 DIAGNOSIS — N89.8 VAGINAL DRYNESS: ICD-10-CM

## 2024-12-02 DIAGNOSIS — Z12.4 PAP SMEAR FOR CERVICAL CANCER SCREENING: ICD-10-CM

## 2024-12-02 DIAGNOSIS — R23.2 HOT FLASHES: ICD-10-CM

## 2024-12-02 DIAGNOSIS — Z01.419 WELL WOMAN EXAM WITH ROUTINE GYNECOLOGICAL EXAM: Primary | ICD-10-CM

## 2024-12-02 DIAGNOSIS — Z87.42 HISTORY OF ABNORMAL CERVICAL PAP SMEAR: ICD-10-CM

## 2024-12-02 PROCEDURE — 1159F MED LIST DOCD IN RCRD: CPT | Mod: CPTII,S$GLB,, | Performed by: FAMILY MEDICINE

## 2024-12-02 PROCEDURE — 87624 HPV HI-RISK TYP POOLED RSLT: CPT | Performed by: FAMILY MEDICINE

## 2024-12-02 PROCEDURE — 3008F BODY MASS INDEX DOCD: CPT | Mod: CPTII,S$GLB,, | Performed by: FAMILY MEDICINE

## 2024-12-02 PROCEDURE — 1160F RVW MEDS BY RX/DR IN RCRD: CPT | Mod: CPTII,S$GLB,, | Performed by: FAMILY MEDICINE

## 2024-12-02 PROCEDURE — 99999 PR PBB SHADOW E&M-EST. PATIENT-LVL IV: CPT | Mod: PBBFAC,,, | Performed by: FAMILY MEDICINE

## 2024-12-02 PROCEDURE — 99385 PREV VISIT NEW AGE 18-39: CPT | Mod: S$GLB,,, | Performed by: FAMILY MEDICINE

## 2024-12-02 PROCEDURE — 88175 CYTOPATH C/V AUTO FLUID REDO: CPT | Performed by: FAMILY MEDICINE

## 2024-12-02 PROCEDURE — 3074F SYST BP LT 130 MM HG: CPT | Mod: CPTII,S$GLB,, | Performed by: FAMILY MEDICINE

## 2024-12-02 PROCEDURE — 3078F DIAST BP <80 MM HG: CPT | Mod: CPTII,S$GLB,, | Performed by: FAMILY MEDICINE

## 2024-12-02 NOTE — PROGRESS NOTES
"  HISTORY OF THE PRESENT ILLNESS    2024  Junaa Jean is a 34 y.o. here for Annual Exam  . Patient reports history of abnormal pap smears but when repeat pap it was normal.  Patient reports decreased sex drive, vaginal dryness, and mood swings for the last 6 months. Patient is requesting menopause lab work today.   Has paraguard IUD and no complaints with this.     G'sP's:   LMP: Patient's last menstrual period was 2024 (exact date).  Relationship:   Contraception: Copper IUD    PAP'S: remote h/o abnormal & cleared to routine surveillance  PAP: PAP neg / Date: 1-2 years ago    HPV Vaccine: declines     LABS & RADS   Lab Results   Component Value Date    WBC 10.91 2024    HGB 14.1 2024    HCT 42.8 2024     2024    MCV 91 2024      Lab Results   Component Value Date    TSH 2.148 2024      No results found for: "LABURIN"  Lab Results   Component Value Date    HEPBSAG Negative 2019    FMC34VSGV negative 2019     Lab Results   Component Value Date    LABCHLA neg 2019    LABNGO neg 2019        GYNECOLOGIC HISTORY  Years ago had abnormal  (ASCUS) pap and went back and it was fine  Thinks had HPV on pap previously  but none since.     Genital HSV: no  STD Hx: no past history    Menarche:    Period duration: 7 days  # Heavy Days: 3  Pad/tampon ? on heavy days: hourly  Intermenstrual bleeding: No  Period frequency:regular every 28-30 days    OBSTETRIC HISTORY  OB History    Para Term  AB Living   1 1 1     1   SAB IAB Ectopic Multiple Live Births         0 1      # Outcome Date GA Lbr Alfonso/2nd Weight Sex Type Anes PTL Lv   1 Term 20 40w3d  3.416 kg (7 lb 8.5 oz) F CS-LTranv EPI N EDISON      Complications: Fetal Intolerance       PAST MEDICAL HISTORY  -------------------------------------    Encounter for blood transfusion    as an infant    Mental disorder    anxiety    PONV (postoperative nausea and " vomiting)    Thyroid disease         PAST SURGICAL HISTORY  ----------------------------     section    Procedure:  SECTION;  Surgeon: Placido Eden MD;  Location: [facility] L&D;  Service: OB/GYN;  Laterality: N/A;     section    Epidural steroid injection into lumbar spine    Procedure: Injection-steroid-epidural-lumbar;  Surgeon: Liv Santos MD;  Location: [facility] OR;  Service: Anesthesiology;  Laterality: N/A;  ( add on per MD )    Laminectomy using minimally invasive technique    Procedure: LAMINECTOMY, SPINE, MINIMALLY INVASIVE, L4-5;  Surgeon: Braulio Henning MD;  Location: [facility] OR;  Service: Neurosurgery;  Laterality: N/A;    Lasik    Minimally invasive surgical removal of intervertebral disc of spine using microscope    Procedure: DISCECTOMY, SPINE, MINIMALLY INVASIVE, USING MICROSCOPE, L4-5;  Surgeon: Braulio Henning MD;  Location: [facility] OR;  Service: Neurosurgery;  Laterality: N/A;    Transforaminal epidural injection of steroid    Procedure: Injection,steroid,epidural,transforaminal approach;  Surgeon: Liv Santos MD;  Location: [facility] OR;  Service: Anesthesiology;  Laterality: Left;  L5-s1 and s1 TFESI    [place] tooth extraction         ALLERGIES  Review of patient's allergies indicates:  No Known Allergies    MEDICATIONS  Current Outpatient Medications   Medication Instructions    busPIRone (BUSPAR) 10 mg, Oral, 3 times daily PRN    cetirizine (ZYRTEC) 10 mg, Oral, Daily    FLUoxetine 20 MG capsule Take 1 capsule by mouth twice daily    levothyroxine (SYNTHROID) 150 MCG tablet Take 1 tablet by mouth once daily in the morning.    ondansetron (ZOFRAN-ODT) 4 mg, Oral, Every 6 hours PRN    tirzepatide (weight loss) 7.5 mg    tretinoin (RETIN-A) 0.1 % cream Topical (Top), Nightly    triamcinolone acetonide 0.1% (KENALOG) 0.1 % ointment Apply  a thin layer to affected area 2 (two) times a day.    valACYclovir (VALTREX) 500 mg, Oral, Daily    VITAMIN D2 50,000 Units, Oral, Every 7 days  "      SOCIAL HISTORY  Social History     Tobacco Use    Smoking status: Never    Smokeless tobacco: Never   Substance Use Topics    Alcohol use: Yes     Comment: occasionally    Drug use: Never      Lives with:   Domestic Violence: no  Occupation:  [employer]      FAMILY HISTORY  BLEEDING or  CLOTTING DISORDERS: none  BREAST CA: maternal aunt  UTERINE CA: none  OVARIAN CA: none  COLON CA: none    REVIEW OF SYSTEMS  Review of Systems   Constitutional:  Negative for activity change, appetite change and fever.   Respiratory:  Negative for cough and shortness of breath.    Genitourinary:  Positive for hot flashes, menorrhagia and vaginal dryness. Negative for dysuria, flank pain, frequency, pelvic pain, urgency and urinary incontinence.   Psychiatric/Behavioral:  Negative for depression.      --------------------------------------------------------------------------------------------------------------    PHYSICAL EXAM  VITALS:  Vitals:    12/02/24 1052   BP: 102/76   BP Location: Left arm   Patient Position: Sitting   Pulse: 77   SpO2: 99%   Weight: 64.6 kg (142 lb 6.7 oz)   Height: 5' 4" (1.626 m)     Exam conducted with a chaperone present.     Physical Exam:   Constitutional: She is oriented to person, place, and time. She appears well-developed and well-nourished.    HENT:   Head: Normocephalic.    Eyes: Pupils are equal, round, and reactive to light. Conjunctivae and EOM are normal.      Pulmonary/Chest: Effort normal.          Genitourinary:    Inguinal canal, vagina, uterus, right adnexa, left adnexa and rectum normal.      Pelvic exam was performed with patient in the lithotomy position.   The external female genitalia was normal.   Genitalia hair distrobution normal .   Cervix is normal. Vagina was moist.   pap smear completedUterus consistancy normal and Uerus contour normal  IUD strings visualized.          Musculoskeletal: Normal range of motion and moves all extremeties.       Neurological: She is " alert and oriented to person, place, and time.    Skin: Skin is warm and dry.    Psychiatric: She has a normal mood and affect.          ASSESSMENT AND PLAN:  Juana Jean 34 y.o.   Juana was seen today for annual exam.    Diagnoses and all orders for this visit:    Well woman exam with routine gynecological exam / Pap smear for cervical cancer screening / History of abnormal cervical Pap smear  -     HPV High Risk Genotypes, PCR  -     Liquid-Based Pap Smear, Screening    Hot flashes / Vaginal dryness  -     Luteinizing Hormone; Future  -     Follicle Stimulating Hormone; Future  -     Estradiol; Future          Cervical Cancer screening: f/u results of PAP / HPV --> if both negative then next screen in 3 yrs  HPV Vaccine: declines    The patient's current birth control Paraguard IUD was discussed.    From a gynecologic standpoint the patient is currently doing well without complaints.     Patient was counseled today on :  Pap guidelines  Recommend periodic pelvic exams with visual inspection and palpation  mammograms starting annually at age 40  Encouraged self breast awareness; RTC for breast concerns  Colonoscopy after the age of 50  Dexa Bone Scan and calcium and vitamin D supplementation in menopause and to see her PCP for other health maintenance.     RTC for periodic GYN exam, sooner prn      Isabel Ace     Follow up if symptoms worsen or fail to improve.   Future Appointments   Date Time Provider Department Center   12/4/2024  2:15 PM Shalom Pabon MD [facility]   3/26/2025 11:00 AM Remington Nix, PharmD [facility]         Tests to Keep You Healthy    Cervical Cancer Screening: ORDERED

## 2024-12-04 ENCOUNTER — LAB VISIT (OUTPATIENT)
Dept: LAB | Facility: HOSPITAL | Age: 34
End: 2024-12-04
Attending: FAMILY MEDICINE
Payer: COMMERCIAL

## 2024-12-04 ENCOUNTER — PATIENT MESSAGE (OUTPATIENT)
Dept: OPHTHALMOLOGY | Facility: CLINIC | Age: 34
End: 2024-12-04
Payer: COMMERCIAL

## 2024-12-04 DIAGNOSIS — R23.2 HOT FLASHES: ICD-10-CM

## 2024-12-04 DIAGNOSIS — N89.8 VAGINAL DRYNESS: ICD-10-CM

## 2024-12-04 PROCEDURE — 83001 ASSAY OF GONADOTROPIN (FSH): CPT | Performed by: FAMILY MEDICINE

## 2024-12-04 PROCEDURE — 36415 COLL VENOUS BLD VENIPUNCTURE: CPT | Performed by: FAMILY MEDICINE

## 2024-12-04 PROCEDURE — 83002 ASSAY OF GONADOTROPIN (LH): CPT | Performed by: FAMILY MEDICINE

## 2024-12-04 PROCEDURE — 82670 ASSAY OF TOTAL ESTRADIOL: CPT | Performed by: FAMILY MEDICINE

## 2024-12-05 LAB
ESTRADIOL SERPL-MCNC: 261 PG/ML
FSH SERPL-ACNC: 10.3 MIU/ML
LH SERPL-ACNC: 39.6 MIU/ML

## 2024-12-06 LAB
FINAL PATHOLOGIC DIAGNOSIS: NORMAL
Lab: NORMAL

## 2024-12-11 RX ORDER — METHYLPREDNISOLONE 4 MG/1
TABLET ORAL
Qty: 21 EACH | Refills: 0 | Status: SHIPPED | OUTPATIENT
Start: 2024-12-11 | End: 2025-01-01

## 2024-12-11 RX ORDER — AZITHROMYCIN 250 MG/1
TABLET, FILM COATED ORAL
Qty: 6 TABLET | Refills: 0 | Status: SHIPPED | OUTPATIENT
Start: 2024-12-11 | End: 2024-12-16

## 2024-12-17 ENCOUNTER — PATIENT MESSAGE (OUTPATIENT)
Dept: OBSTETRICS AND GYNECOLOGY | Facility: CLINIC | Age: 34
End: 2024-12-17
Payer: COMMERCIAL

## 2025-01-28 DIAGNOSIS — E03.9 HYPOTHYROIDISM, UNSPECIFIED TYPE: Primary | ICD-10-CM

## 2025-01-29 ENCOUNTER — LAB VISIT (OUTPATIENT)
Dept: LAB | Facility: HOSPITAL | Age: 35
End: 2025-01-29
Attending: NURSE PRACTITIONER
Payer: COMMERCIAL

## 2025-01-29 DIAGNOSIS — E03.9 HYPOTHYROIDISM, UNSPECIFIED TYPE: ICD-10-CM

## 2025-01-29 LAB — TSH SERPL DL<=0.005 MIU/L-ACNC: 1.52 UIU/ML (ref 0.34–5.6)

## 2025-01-29 PROCEDURE — 84443 ASSAY THYROID STIM HORMONE: CPT | Performed by: NURSE PRACTITIONER

## 2025-01-29 PROCEDURE — 36415 COLL VENOUS BLD VENIPUNCTURE: CPT | Performed by: NURSE PRACTITIONER

## 2025-01-29 RX ORDER — LEVOTHYROXINE SODIUM 125 UG/1
125 TABLET ORAL
Qty: 30 TABLET | Refills: 11 | Status: SHIPPED | OUTPATIENT
Start: 2025-01-29 | End: 2026-01-29

## 2025-02-01 DIAGNOSIS — R50.9 FEVER, UNSPECIFIED FEVER CAUSE: Primary | ICD-10-CM

## 2025-02-05 RX ORDER — VALACYCLOVIR HYDROCHLORIDE 500 MG/1
500 TABLET, FILM COATED ORAL DAILY
Qty: 90 TABLET | Refills: 0 | Status: SHIPPED | OUTPATIENT
Start: 2025-02-05 | End: 2025-05-12

## 2025-02-11 RX ORDER — ERGOCALCIFEROL 1.25 MG/1
50000 CAPSULE ORAL
Qty: 13 CAPSULE | Refills: 3 | Status: SHIPPED | OUTPATIENT
Start: 2025-02-11

## 2025-02-13 DIAGNOSIS — R29.898 DECREASED STRENGTH OF LOWER EXTREMITY: Primary | ICD-10-CM

## 2025-02-15 RX ORDER — OSELTAMIVIR PHOSPHATE 75 MG/1
75 CAPSULE ORAL 2 TIMES DAILY
Qty: 10 CAPSULE | Refills: 0 | Status: SHIPPED | OUTPATIENT
Start: 2025-02-15 | End: 2025-02-20

## 2025-02-28 RX ORDER — CETIRIZINE HYDROCHLORIDE 10 MG/1
10 TABLET ORAL DAILY
Qty: 30 TABLET | Refills: 3 | Status: SHIPPED | OUTPATIENT
Start: 2025-02-28 | End: 2026-02-28

## 2025-04-01 DIAGNOSIS — E78.5 DYSLIPIDEMIA: Primary | ICD-10-CM

## 2025-04-01 DIAGNOSIS — E03.9 HYPOTHYROIDISM, UNSPECIFIED TYPE: ICD-10-CM

## 2025-04-04 ENCOUNTER — LAB VISIT (OUTPATIENT)
Dept: LAB | Facility: HOSPITAL | Age: 35
End: 2025-04-04
Attending: NURSE PRACTITIONER
Payer: COMMERCIAL

## 2025-04-04 DIAGNOSIS — E03.9 HYPOTHYROIDISM, UNSPECIFIED TYPE: ICD-10-CM

## 2025-04-04 DIAGNOSIS — E78.5 DYSLIPIDEMIA: ICD-10-CM

## 2025-04-04 LAB
25(OH)D3+25(OH)D2 SERPL-MCNC: 36 NG/ML (ref 30–96)
ABSOLUTE EOSINOPHIL (OHS): 0.08 K/UL
ABSOLUTE MONOCYTE (OHS): 0.32 K/UL (ref 0.3–1)
ABSOLUTE NEUTROPHIL COUNT (OHS): 3.74 K/UL (ref 1.8–7.7)
ALBUMIN SERPL BCP-MCNC: 4.1 G/DL (ref 3.5–5.2)
ALP SERPL-CCNC: 63 UNIT/L (ref 40–150)
ALT SERPL W/O P-5'-P-CCNC: 22 UNIT/L (ref 10–44)
ANION GAP (OHS): 6 MMOL/L (ref 8–16)
AST SERPL-CCNC: 22 UNIT/L (ref 11–45)
BASOPHILS # BLD AUTO: 0.05 K/UL
BASOPHILS NFR BLD AUTO: 0.9 %
BILIRUB SERPL-MCNC: 0.7 MG/DL (ref 0.1–1)
BUN SERPL-MCNC: 11 MG/DL (ref 6–20)
CALCIUM SERPL-MCNC: 8.7 MG/DL (ref 8.7–10.5)
CHLORIDE SERPL-SCNC: 105 MMOL/L (ref 95–110)
CHOLEST SERPL-MCNC: 172 MG/DL (ref 120–199)
CHOLEST/HDLC SERPL: 2.7 {RATIO} (ref 2–5)
CO2 SERPL-SCNC: 26 MMOL/L (ref 23–29)
CREAT SERPL-MCNC: 0.7 MG/DL (ref 0.5–1.4)
ERYTHROCYTE [DISTWIDTH] IN BLOOD BY AUTOMATED COUNT: 12.3 % (ref 11.5–14.5)
GFR SERPLBLD CREATININE-BSD FMLA CKD-EPI: >60 ML/MIN/1.73/M2
GLUCOSE SERPL-MCNC: 79 MG/DL (ref 70–110)
HCT VFR BLD AUTO: 39.2 % (ref 37–48.5)
HDLC SERPL-MCNC: 64 MG/DL (ref 40–75)
HDLC SERPL: 37.2 % (ref 20–50)
HGB BLD-MCNC: 12.7 GM/DL (ref 12–16)
IMM GRANULOCYTES # BLD AUTO: 0.01 K/UL (ref 0–0.04)
IMM GRANULOCYTES NFR BLD AUTO: 0.2 % (ref 0–0.5)
LDLC SERPL CALC-MCNC: 94 MG/DL (ref 63–159)
LYMPHOCYTES # BLD AUTO: 1.66 K/UL (ref 1–4.8)
MCH RBC QN AUTO: 30.2 PG (ref 27–31)
MCHC RBC AUTO-ENTMCNC: 32.4 G/DL (ref 32–36)
MCV RBC AUTO: 93 FL (ref 82–98)
NONHDLC SERPL-MCNC: 108 MG/DL
NUCLEATED RBC (/100WBC) (OHS): 0 /100 WBC
PLATELET # BLD AUTO: 198 K/UL (ref 150–450)
PMV BLD AUTO: 11.1 FL (ref 9.2–12.9)
POTASSIUM SERPL-SCNC: 3.7 MMOL/L (ref 3.5–5.1)
PROT SERPL-MCNC: 7.3 GM/DL (ref 6–8.4)
RBC # BLD AUTO: 4.2 M/UL (ref 4–5.4)
RELATIVE EOSINOPHIL (OHS): 1.4 %
RELATIVE LYMPHOCYTE (OHS): 28.3 % (ref 18–48)
RELATIVE MONOCYTE (OHS): 5.5 % (ref 4–15)
RELATIVE NEUTROPHIL (OHS): 63.7 % (ref 38–73)
SODIUM SERPL-SCNC: 137 MMOL/L (ref 136–145)
TRIGL SERPL-MCNC: 70 MG/DL (ref 30–150)
TSH SERPL-ACNC: 3.56 UIU/ML (ref 0.4–4)
VIT B12 SERPL-MCNC: >2000 PG/ML (ref 210–950)
WBC # BLD AUTO: 5.86 K/UL (ref 3.9–12.7)

## 2025-04-04 PROCEDURE — 82607 VITAMIN B-12: CPT

## 2025-04-04 PROCEDURE — 84443 ASSAY THYROID STIM HORMONE: CPT

## 2025-04-04 PROCEDURE — 80061 LIPID PANEL: CPT

## 2025-04-04 PROCEDURE — 80053 COMPREHEN METABOLIC PANEL: CPT

## 2025-04-04 PROCEDURE — 82306 VITAMIN D 25 HYDROXY: CPT

## 2025-04-04 PROCEDURE — 36415 COLL VENOUS BLD VENIPUNCTURE: CPT | Mod: PO

## 2025-04-04 PROCEDURE — 85025 COMPLETE CBC W/AUTO DIFF WBC: CPT

## 2025-05-02 RX ORDER — AZITHROMYCIN 250 MG/1
TABLET, FILM COATED ORAL
Qty: 6 TABLET | Refills: 0 | Status: SHIPPED | OUTPATIENT
Start: 2025-05-02 | End: 2025-05-07

## 2025-05-08 DIAGNOSIS — R50.9 FEVER, UNSPECIFIED FEVER CAUSE: ICD-10-CM

## 2025-05-08 RX ORDER — VALACYCLOVIR HYDROCHLORIDE 500 MG/1
500 TABLET, FILM COATED ORAL DAILY
Qty: 90 TABLET | Refills: 0 | Status: SHIPPED | OUTPATIENT
Start: 2025-05-08 | End: 2025-08-06

## 2025-06-01 DIAGNOSIS — B02.9 HERPES ZOSTER WITHOUT COMPLICATION: Primary | ICD-10-CM

## 2025-06-01 RX ORDER — PREGABALIN 150 MG/1
150 CAPSULE ORAL 3 TIMES DAILY
Qty: 90 CAPSULE | Refills: 0 | Status: CANCELLED | OUTPATIENT
Start: 2025-06-01 | End: 2025-07-01

## 2025-06-01 RX ORDER — LIDOCAINE 50 MG/G
1 PATCH TOPICAL DAILY
Qty: 30 PATCH | Refills: 0 | Status: SHIPPED | OUTPATIENT
Start: 2025-06-01 | End: 2025-06-03 | Stop reason: SDUPTHER

## 2025-06-03 RX ORDER — LIDOCAINE 50 MG/G
1 PATCH TOPICAL DAILY
Qty: 30 PATCH | Refills: 0 | Status: SHIPPED | OUTPATIENT
Start: 2025-06-03

## 2025-06-12 DIAGNOSIS — R59.0 LOCALIZED ENLARGED LYMPH NODES: Primary | ICD-10-CM

## 2025-06-17 ENCOUNTER — LAB VISIT (OUTPATIENT)
Dept: LAB | Facility: HOSPITAL | Age: 35
End: 2025-06-17
Payer: COMMERCIAL

## 2025-06-17 DIAGNOSIS — E03.9 HYPOTHYROIDISM, UNSPECIFIED TYPE: Primary | ICD-10-CM

## 2025-06-17 DIAGNOSIS — E03.9 HYPOTHYROIDISM, UNSPECIFIED TYPE: ICD-10-CM

## 2025-06-17 LAB
T4 FREE SERPL-MCNC: 0.84 NG/DL (ref 0.71–1.51)
TSH SERPL-ACNC: 11.61 UIU/ML (ref 0.34–5.6)

## 2025-06-17 PROCEDURE — 84443 ASSAY THYROID STIM HORMONE: CPT

## 2025-06-17 PROCEDURE — 36415 COLL VENOUS BLD VENIPUNCTURE: CPT

## 2025-06-17 PROCEDURE — 84439 ASSAY OF FREE THYROXINE: CPT

## 2025-06-18 ENCOUNTER — HOSPITAL ENCOUNTER (OUTPATIENT)
Dept: RADIOLOGY | Facility: HOSPITAL | Age: 35
Discharge: HOME | End: 2025-06-18
Payer: COMMERCIAL

## 2025-06-18 DIAGNOSIS — R59.0 LOCALIZED ENLARGED LYMPH NODES: ICD-10-CM

## 2025-06-18 PROCEDURE — 70490 CT SOFT TISSUE NECK W/O DYE: CPT | Mod: TC

## 2025-06-18 PROCEDURE — 70490 CT SOFT TISSUE NECK W/O DYE: CPT | Mod: 26,,, | Performed by: RADIOLOGY

## 2025-07-22 RX ORDER — FLUOXETINE 20 MG/1
CAPSULE ORAL
Qty: 180 CAPSULE | Refills: 3 | Status: SHIPPED | OUTPATIENT
Start: 2025-07-22

## 2025-07-25 ENCOUNTER — TELEPHONE (OUTPATIENT)
Dept: NEUROSURGERY | Facility: CLINIC | Age: 35
End: 2025-07-25
Payer: COMMERCIAL

## 2025-07-25 ENCOUNTER — PATIENT MESSAGE (OUTPATIENT)
Dept: NEUROSURGERY | Facility: CLINIC | Age: 35
End: 2025-07-25
Payer: COMMERCIAL

## 2025-07-25 DIAGNOSIS — M54.9 DORSALGIA, UNSPECIFIED: Primary | ICD-10-CM

## 2025-07-29 ENCOUNTER — TELEPHONE (OUTPATIENT)
Dept: PAIN MEDICINE | Facility: CLINIC | Age: 35
End: 2025-07-29
Payer: COMMERCIAL

## 2025-07-29 ENCOUNTER — HOSPITAL ENCOUNTER (OUTPATIENT)
Dept: RADIOLOGY | Facility: HOSPITAL | Age: 35
Discharge: HOME | End: 2025-07-29
Attending: NURSE PRACTITIONER
Payer: COMMERCIAL

## 2025-07-29 ENCOUNTER — PATIENT MESSAGE (OUTPATIENT)
Dept: PAIN MEDICINE | Facility: CLINIC | Age: 35
End: 2025-07-29
Payer: COMMERCIAL

## 2025-07-29 DIAGNOSIS — M54.9 DORSALGIA, UNSPECIFIED: ICD-10-CM

## 2025-07-29 DIAGNOSIS — M54.17 LUMBOSACRAL RADICULOPATHY: Primary | ICD-10-CM

## 2025-07-29 PROCEDURE — 72158 MRI LUMBAR SPINE W/O & W/DYE: CPT | Mod: 26,,, | Performed by: RADIOLOGY

## 2025-07-29 PROCEDURE — 25500020 PHARM REV CODE 255: Performed by: NURSE PRACTITIONER

## 2025-07-29 PROCEDURE — A9585 GADOBUTROL INJECTION: HCPCS | Performed by: NURSE PRACTITIONER

## 2025-07-29 PROCEDURE — 72158 MRI LUMBAR SPINE W/O & W/DYE: CPT | Mod: TC

## 2025-07-29 RX ORDER — METHYLPREDNISOLONE 4 MG/1
TABLET ORAL
Qty: 21 EACH | Refills: 0 | Status: SHIPPED | OUTPATIENT
Start: 2025-07-29

## 2025-07-29 RX ORDER — GADOBUTROL 604.72 MG/ML
6 INJECTION INTRAVENOUS
Status: COMPLETED | OUTPATIENT
Start: 2025-07-29 | End: 2025-07-29

## 2025-07-29 RX ADMIN — GADOBUTROL 6 ML: 604.72 INJECTION INTRAVENOUS at 09:07

## 2025-07-29 NOTE — TELEPHONE ENCOUNTER
----- Message from MICHELLE Fierro sent at 7/29/2025 11:50 AM CDT -----  Regarding: Nati Santos!    You saw this patient last year and she ended up having surgery with Dr Henning.  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  She is having to follow up with him because of some recurrent pain and is waiting to get in to see him.  Do you mind sending in a Medrol Dosepack for her to [facility]?  I havent seen her as a patient but she reached out to me and let me know what was going on.      Thanks,    Sekou

## 2025-08-06 ENCOUNTER — OFFICE VISIT (OUTPATIENT)
Dept: NEUROSURGERY | Facility: CLINIC | Age: 35
End: 2025-08-06
Payer: COMMERCIAL

## 2025-08-06 DIAGNOSIS — M54.16 LUMBAR RADICULOPATHY: Primary | ICD-10-CM

## 2025-08-06 DIAGNOSIS — M54.9 BACK PAIN, UNSPECIFIED BACK LOCATION, UNSPECIFIED BACK PAIN LATERALITY, UNSPECIFIED CHRONICITY: ICD-10-CM

## 2025-08-06 DIAGNOSIS — M47.816 LUMBAR SPONDYLOSIS: ICD-10-CM

## 2025-08-06 DIAGNOSIS — M51.362 DEGENERATION OF INTERVERTEBRAL DISC OF LUMBAR REGION WITH DISCOGENIC BACK PAIN AND LOWER EXTREMITY PAIN: ICD-10-CM

## 2025-08-06 PROCEDURE — 98005 SYNCH AUDIO-VIDEO EST LOW 20: CPT | Mod: 95,,, | Performed by: PHYSICIAN ASSISTANT

## 2025-08-06 RX ORDER — TIZANIDINE 4 MG/1
4 TABLET ORAL EVERY 6 HOURS PRN
Qty: 60 TABLET | Refills: 1 | Status: SHIPPED | OUTPATIENT
Start: 2025-08-06 | End: 2025-09-05

## 2025-08-06 NOTE — PROGRESS NOTES
The patient location is: [place]  The chief complaint leading to consultation is: low back pain and leg pain    Visit type: audiovisual    Face to Face time with patient: 7min  22 minutes of total time spent on the encounter, which includes face to face time and non-face to face time preparing to see the patient (eg, review of tests), Obtaining and/or reviewing separately obtained history, Documenting clinical information in the electronic or other health record, Independently interpreting results (not separately reported) and communicating results to the patient/family/caregiver, or Care coordination (not separately reported).         Each patient to whom he or she provides medical services by telemedicine is:  (1) informed of the relationship between the physician and patient and the respective role of any other health care provider with respect to management of the patient; and (2) notified that he or she may decline to receive medical services by telemedicine and may withdraw from such care at any time.    Notes:   Telehealth follow up for imaging reviewed independently today. She has continued back and leg pain down to the knee and occasionally in her calf. It is difficult and takes some time to go from sitting/standing. Her films shows a bad back worst from L4-S1 with degenerated discs, osteophytes and facet arthropathy causing some foraminal narrowing. No large free fragment of disc. From a surgical standpoint, the only current option would be an L4-S1 fusion, which she does not want. I think she would benefit from pain mgmt with an L4/5 JOSE to start, followed by possibly L5/S1. She may also benefit from L4-S1 MBB/RFAs to help with her axial back pain. She will discuss this with Dr. Bauman and will let us know if her symptoms change. She may follow up when she is ready to discuss surgery.

## 2025-08-19 ENCOUNTER — PATIENT MESSAGE (OUTPATIENT)
Dept: NEUROSURGERY | Facility: CLINIC | Age: 35
End: 2025-08-19
Payer: COMMERCIAL

## 2025-08-19 ENCOUNTER — TELEPHONE (OUTPATIENT)
Dept: PAIN MEDICINE | Facility: CLINIC | Age: 35
End: 2025-08-19
Payer: COMMERCIAL

## 2025-08-19 DIAGNOSIS — M54.16 LUMBAR RADICULOPATHY: Primary | ICD-10-CM

## 2025-09-03 ENCOUNTER — PATIENT MESSAGE (OUTPATIENT)
Dept: PAIN MEDICINE | Facility: CLINIC | Age: 35
End: 2025-09-03
Payer: COMMERCIAL

## 2025-09-03 DIAGNOSIS — M54.16 LUMBAR RADICULOPATHY: Primary | ICD-10-CM

## 2025-09-03 RX ORDER — GABAPENTIN 300 MG/1
300 CAPSULE ORAL 3 TIMES DAILY
Qty: 90 CAPSULE | Refills: 11 | Status: SHIPPED | OUTPATIENT
Start: 2025-09-03 | End: 2026-09-03

## 2025-09-04 DIAGNOSIS — L40.9 PSORIASIS: Primary | ICD-10-CM

## 2025-09-04 RX ORDER — CLOBETASOL PROPIONATE 0.05 G/100ML
SHAMPOO TOPICAL DAILY
Qty: 118 ML | Refills: 1 | Status: SHIPPED | OUTPATIENT
Start: 2025-09-04

## (undated) DEVICE — SYS LABEL CORRECT MED

## (undated) DEVICE — SYR DISP LL 5CC

## (undated) DEVICE — NDL SPINAL SPINOCAN 22GX3.5

## (undated) DEVICE — NDL BLUNT W/O FILTER 18GX1.5IN

## (undated) DEVICE — DRESSING POST OP MEPILEX  AG 4X10

## (undated) DEVICE — APPLICATOR CHLORAPREP ORN 26ML

## (undated) DEVICE — NDL SPINAL 22GX5

## (undated) DEVICE — SYR PLASTIC 8ML PERIFIX LL

## (undated) DEVICE — GLOVE SENSICARE PI ALOE 6

## (undated) DEVICE — TOWEL OR DISP STRL BLUE 4/PK

## (undated) DEVICE — SYR 10CC LUER LOCK

## (undated) DEVICE — SOL SOD CHLORIDE 0.9% 10ML

## (undated) DEVICE — SPONGE BULKEE II ABSRB 6X6.75

## (undated) DEVICE — COVER PROXIMA MAYO STAND